# Patient Record
Sex: MALE | Race: BLACK OR AFRICAN AMERICAN | NOT HISPANIC OR LATINO | URBAN - METROPOLITAN AREA
[De-identification: names, ages, dates, MRNs, and addresses within clinical notes are randomized per-mention and may not be internally consistent; named-entity substitution may affect disease eponyms.]

---

## 2018-09-20 ENCOUNTER — INPATIENT (INPATIENT)
Facility: HOSPITAL | Age: 73
LOS: 1 days | Discharge: HOME | End: 2018-09-22
Attending: INTERNAL MEDICINE | Admitting: INTERNAL MEDICINE
Payer: MEDICARE

## 2018-09-20 VITALS
TEMPERATURE: 98 F | RESPIRATION RATE: 17 BRPM | SYSTOLIC BLOOD PRESSURE: 149 MMHG | HEART RATE: 84 BPM | OXYGEN SATURATION: 98 % | DIASTOLIC BLOOD PRESSURE: 106 MMHG | HEIGHT: 70 IN | WEIGHT: 199.96 LBS

## 2018-09-20 DIAGNOSIS — I25.10 ATHEROSCLEROTIC HEART DISEASE OF NATIVE CORONARY ARTERY WITHOUT ANGINA PECTORIS: ICD-10-CM

## 2018-09-20 DIAGNOSIS — I10 ESSENTIAL (PRIMARY) HYPERTENSION: ICD-10-CM

## 2018-09-20 DIAGNOSIS — N40.1 BENIGN PROSTATIC HYPERPLASIA WITH LOWER URINARY TRACT SYMPTOMS: ICD-10-CM

## 2018-09-20 DIAGNOSIS — R53.1 WEAKNESS: ICD-10-CM

## 2018-09-20 DIAGNOSIS — E11.9 TYPE 2 DIABETES MELLITUS WITHOUT COMPLICATIONS: ICD-10-CM

## 2018-09-20 DIAGNOSIS — E16.2 HYPOGLYCEMIA, UNSPECIFIED: ICD-10-CM

## 2018-09-20 PROBLEM — Z00.00 ENCOUNTER FOR PREVENTIVE HEALTH EXAMINATION: Status: ACTIVE | Noted: 2018-09-20

## 2018-09-20 LAB
ALBUMIN SERPL ELPH-MCNC: 3.8 G/DL — SIGNIFICANT CHANGE UP (ref 3.3–5)
ALP SERPL-CCNC: 72 U/L — SIGNIFICANT CHANGE UP (ref 40–120)
ALT FLD-CCNC: 25 U/L — SIGNIFICANT CHANGE UP (ref 12–78)
ANION GAP SERPL CALC-SCNC: 7 MMOL/L — SIGNIFICANT CHANGE UP (ref 5–17)
APPEARANCE UR: CLEAR — SIGNIFICANT CHANGE UP
APTT BLD: 31.4 SEC — SIGNIFICANT CHANGE UP (ref 27.5–37.4)
AST SERPL-CCNC: 30 U/L — SIGNIFICANT CHANGE UP (ref 15–37)
BASOPHILS # BLD AUTO: 0.03 K/UL — SIGNIFICANT CHANGE UP (ref 0–0.2)
BASOPHILS NFR BLD AUTO: 0.6 % — SIGNIFICANT CHANGE UP (ref 0–2)
BILIRUB SERPL-MCNC: 0.3 MG/DL — SIGNIFICANT CHANGE UP (ref 0.2–1.2)
BILIRUB UR-MCNC: NEGATIVE — SIGNIFICANT CHANGE UP
BUN SERPL-MCNC: 13 MG/DL — SIGNIFICANT CHANGE UP (ref 7–23)
CALCIUM SERPL-MCNC: 8.9 MG/DL — SIGNIFICANT CHANGE UP (ref 8.5–10.1)
CHLORIDE SERPL-SCNC: 110 MMOL/L — HIGH (ref 96–108)
CK MB CFR SERPL CALC: 4.1 NG/ML — HIGH (ref 0.5–3.6)
CO2 SERPL-SCNC: 28 MMOL/L — SIGNIFICANT CHANGE UP (ref 22–31)
COLOR SPEC: YELLOW — SIGNIFICANT CHANGE UP
CREAT SERPL-MCNC: 1.07 MG/DL — SIGNIFICANT CHANGE UP (ref 0.5–1.3)
DIFF PNL FLD: NEGATIVE — SIGNIFICANT CHANGE UP
EOSINOPHIL # BLD AUTO: 0 K/UL — SIGNIFICANT CHANGE UP (ref 0–0.5)
EOSINOPHIL NFR BLD AUTO: 0 % — SIGNIFICANT CHANGE UP (ref 0–6)
ESTIMATED AVERAGE GLUCOSE: 169 MG/DL — HIGH (ref 68–114)
GLUCOSE BLDC GLUCOMTR-MCNC: 116 MG/DL — HIGH (ref 70–99)
GLUCOSE BLDC GLUCOMTR-MCNC: 135 MG/DL — HIGH (ref 70–99)
GLUCOSE BLDC GLUCOMTR-MCNC: 51 MG/DL — LOW (ref 70–99)
GLUCOSE BLDC GLUCOMTR-MCNC: 79 MG/DL — SIGNIFICANT CHANGE UP (ref 70–99)
GLUCOSE BLDC GLUCOMTR-MCNC: 97 MG/DL — SIGNIFICANT CHANGE UP (ref 70–99)
GLUCOSE SERPL-MCNC: 50 MG/DL — LOW (ref 70–99)
GLUCOSE UR QL: NEGATIVE MG/DL — SIGNIFICANT CHANGE UP
HBA1C BLD-MCNC: 7.5 % — HIGH (ref 4–5.6)
HBA1C BLD-MCNC: 7.5 % — HIGH (ref 4–5.6)
HCT VFR BLD CALC: 42.5 % — SIGNIFICANT CHANGE UP (ref 39–50)
HGB BLD-MCNC: 13.6 G/DL — SIGNIFICANT CHANGE UP (ref 13–17)
IMM GRANULOCYTES NFR BLD AUTO: 0.2 % — SIGNIFICANT CHANGE UP (ref 0–1.5)
INR BLD: 1.02 RATIO — SIGNIFICANT CHANGE UP (ref 0.88–1.16)
KETONES UR-MCNC: NEGATIVE — SIGNIFICANT CHANGE UP
LEUKOCYTE ESTERASE UR-ACNC: ABNORMAL
LYMPHOCYTES # BLD AUTO: 1.28 K/UL — SIGNIFICANT CHANGE UP (ref 1–3.3)
LYMPHOCYTES # BLD AUTO: 26.1 % — SIGNIFICANT CHANGE UP (ref 13–44)
MCHC RBC-ENTMCNC: 29 PG — SIGNIFICANT CHANGE UP (ref 27–34)
MCHC RBC-ENTMCNC: 32 GM/DL — SIGNIFICANT CHANGE UP (ref 32–36)
MCV RBC AUTO: 90.6 FL — SIGNIFICANT CHANGE UP (ref 80–100)
MONOCYTES # BLD AUTO: 0.34 K/UL — SIGNIFICANT CHANGE UP (ref 0–0.9)
MONOCYTES NFR BLD AUTO: 6.9 % — SIGNIFICANT CHANGE UP (ref 2–14)
NEUTROPHILS # BLD AUTO: 3.25 K/UL — SIGNIFICANT CHANGE UP (ref 1.8–7.4)
NEUTROPHILS NFR BLD AUTO: 66.2 % — SIGNIFICANT CHANGE UP (ref 43–77)
NITRITE UR-MCNC: NEGATIVE — SIGNIFICANT CHANGE UP
PH UR: 7 — SIGNIFICANT CHANGE UP (ref 5–8)
PLATELET # BLD AUTO: 161 K/UL — SIGNIFICANT CHANGE UP (ref 150–400)
POTASSIUM SERPL-MCNC: 4.6 MMOL/L — SIGNIFICANT CHANGE UP (ref 3.5–5.3)
POTASSIUM SERPL-SCNC: 4.6 MMOL/L — SIGNIFICANT CHANGE UP (ref 3.5–5.3)
PROT SERPL-MCNC: 7.9 GM/DL — SIGNIFICANT CHANGE UP (ref 6–8.3)
PROT UR-MCNC: 15 MG/DL
PROTHROM AB SERPL-ACNC: 11.1 SEC — SIGNIFICANT CHANGE UP (ref 9.8–12.7)
RBC # BLD: 4.69 M/UL — SIGNIFICANT CHANGE UP (ref 4.2–5.8)
RBC # FLD: 12.6 % — SIGNIFICANT CHANGE UP (ref 10.3–14.5)
SODIUM SERPL-SCNC: 145 MMOL/L — SIGNIFICANT CHANGE UP (ref 135–145)
SP GR SPEC: 1 — LOW (ref 1.01–1.02)
TROPONIN I SERPL-MCNC: 0.07 NG/ML — HIGH (ref 0.01–0.04)
TROPONIN I SERPL-MCNC: 0.07 NG/ML — HIGH (ref 0.01–0.04)
UROBILINOGEN FLD QL: NEGATIVE MG/DL — SIGNIFICANT CHANGE UP
WBC # BLD: 4.91 K/UL — SIGNIFICANT CHANGE UP (ref 3.8–10.5)
WBC # FLD AUTO: 4.91 K/UL — SIGNIFICANT CHANGE UP (ref 3.8–10.5)

## 2018-09-20 PROCEDURE — 70450 CT HEAD/BRAIN W/O DYE: CPT | Mod: 26

## 2018-09-20 PROCEDURE — 99285 EMERGENCY DEPT VISIT HI MDM: CPT

## 2018-09-20 PROCEDURE — 71045 X-RAY EXAM CHEST 1 VIEW: CPT | Mod: 26

## 2018-09-20 PROCEDURE — 93010 ELECTROCARDIOGRAM REPORT: CPT

## 2018-09-20 PROCEDURE — 70551 MRI BRAIN STEM W/O DYE: CPT | Mod: 26

## 2018-09-20 PROCEDURE — 99223 1ST HOSP IP/OBS HIGH 75: CPT

## 2018-09-20 RX ORDER — METOPROLOL TARTRATE 50 MG
1 TABLET ORAL
Qty: 0 | Refills: 0 | COMMUNITY

## 2018-09-20 RX ORDER — DEXTROSE 50 % IN WATER 50 %
50 SYRINGE (ML) INTRAVENOUS ONCE
Qty: 0 | Refills: 0 | Status: COMPLETED | OUTPATIENT
Start: 2018-09-20 | End: 2018-09-20

## 2018-09-20 RX ORDER — INSULIN LISPRO 100/ML
VIAL (ML) SUBCUTANEOUS AT BEDTIME
Qty: 0 | Refills: 0 | Status: DISCONTINUED | OUTPATIENT
Start: 2018-09-20 | End: 2018-09-22

## 2018-09-20 RX ORDER — ATORVASTATIN CALCIUM 80 MG/1
1 TABLET, FILM COATED ORAL
Qty: 0 | Refills: 0 | COMMUNITY

## 2018-09-20 RX ORDER — ENOXAPARIN SODIUM 100 MG/ML
40 INJECTION SUBCUTANEOUS DAILY
Qty: 0 | Refills: 0 | Status: DISCONTINUED | OUTPATIENT
Start: 2018-09-20 | End: 2018-09-22

## 2018-09-20 RX ORDER — TAMSULOSIN HYDROCHLORIDE 0.4 MG/1
1 CAPSULE ORAL
Qty: 0 | Refills: 0 | COMMUNITY

## 2018-09-20 RX ORDER — PANTOPRAZOLE SODIUM 20 MG/1
40 TABLET, DELAYED RELEASE ORAL
Qty: 0 | Refills: 0 | Status: DISCONTINUED | OUTPATIENT
Start: 2018-09-20 | End: 2018-09-22

## 2018-09-20 RX ORDER — INSULIN GLARGINE 100 [IU]/ML
14 INJECTION, SOLUTION SUBCUTANEOUS AT BEDTIME
Qty: 0 | Refills: 0 | Status: DISCONTINUED | OUTPATIENT
Start: 2018-09-20 | End: 2018-09-22

## 2018-09-20 RX ORDER — DEXTROSE 50 % IN WATER 50 %
25 SYRINGE (ML) INTRAVENOUS ONCE
Qty: 0 | Refills: 0 | Status: DISCONTINUED | OUTPATIENT
Start: 2018-09-20 | End: 2018-09-22

## 2018-09-20 RX ORDER — METFORMIN HYDROCHLORIDE 850 MG/1
1 TABLET ORAL
Qty: 0 | Refills: 0 | COMMUNITY

## 2018-09-20 RX ORDER — ISOSORBIDE MONONITRATE 60 MG/1
1 TABLET, EXTENDED RELEASE ORAL
Qty: 0 | Refills: 0 | COMMUNITY

## 2018-09-20 RX ORDER — FINASTERIDE 5 MG/1
1 TABLET, FILM COATED ORAL
Qty: 0 | Refills: 0 | COMMUNITY

## 2018-09-20 RX ORDER — FINASTERIDE 5 MG/1
5 TABLET, FILM COATED ORAL DAILY
Qty: 0 | Refills: 0 | Status: DISCONTINUED | OUTPATIENT
Start: 2018-09-20 | End: 2018-09-22

## 2018-09-20 RX ORDER — LIRAGLUTIDE 6 MG/ML
1.2 INJECTION SUBCUTANEOUS
Qty: 0 | Refills: 0 | COMMUNITY

## 2018-09-20 RX ORDER — TAMSULOSIN HYDROCHLORIDE 0.4 MG/1
0.4 CAPSULE ORAL AT BEDTIME
Qty: 0 | Refills: 0 | Status: DISCONTINUED | OUTPATIENT
Start: 2018-09-20 | End: 2018-09-22

## 2018-09-20 RX ORDER — CLOPIDOGREL BISULFATE 75 MG/1
75 TABLET, FILM COATED ORAL DAILY
Qty: 0 | Refills: 0 | Status: DISCONTINUED | OUTPATIENT
Start: 2018-09-20 | End: 2018-09-22

## 2018-09-20 RX ORDER — ISOSORBIDE MONONITRATE 60 MG/1
20 TABLET, EXTENDED RELEASE ORAL
Qty: 0 | Refills: 0 | Status: DISCONTINUED | OUTPATIENT
Start: 2018-09-20 | End: 2018-09-20

## 2018-09-20 RX ORDER — DEXTROSE 50 % IN WATER 50 %
12.5 SYRINGE (ML) INTRAVENOUS ONCE
Qty: 0 | Refills: 0 | Status: DISCONTINUED | OUTPATIENT
Start: 2018-09-20 | End: 2018-09-22

## 2018-09-20 RX ORDER — ATORVASTATIN CALCIUM 80 MG/1
10 TABLET, FILM COATED ORAL AT BEDTIME
Qty: 0 | Refills: 0 | Status: DISCONTINUED | OUTPATIENT
Start: 2018-09-20 | End: 2018-09-22

## 2018-09-20 RX ORDER — SODIUM CHLORIDE 9 MG/ML
1000 INJECTION, SOLUTION INTRAVENOUS
Qty: 0 | Refills: 0 | Status: DISCONTINUED | OUTPATIENT
Start: 2018-09-20 | End: 2018-09-22

## 2018-09-20 RX ORDER — OMEPRAZOLE 10 MG/1
1 CAPSULE, DELAYED RELEASE ORAL
Qty: 0 | Refills: 0 | COMMUNITY

## 2018-09-20 RX ORDER — CLOPIDOGREL BISULFATE 75 MG/1
1 TABLET, FILM COATED ORAL
Qty: 0 | Refills: 0 | COMMUNITY

## 2018-09-20 RX ORDER — METOPROLOL TARTRATE 50 MG
25 TABLET ORAL
Qty: 0 | Refills: 0 | Status: DISCONTINUED | OUTPATIENT
Start: 2018-09-20 | End: 2018-09-22

## 2018-09-20 RX ORDER — DEXTROSE 50 % IN WATER 50 %
15 SYRINGE (ML) INTRAVENOUS ONCE
Qty: 0 | Refills: 0 | Status: DISCONTINUED | OUTPATIENT
Start: 2018-09-20 | End: 2018-09-22

## 2018-09-20 RX ORDER — GLUCAGON INJECTION, SOLUTION 0.5 MG/.1ML
1 INJECTION, SOLUTION SUBCUTANEOUS ONCE
Qty: 0 | Refills: 0 | Status: DISCONTINUED | OUTPATIENT
Start: 2018-09-20 | End: 2018-09-22

## 2018-09-20 RX ORDER — ISOSORBIDE MONONITRATE 60 MG/1
30 TABLET, EXTENDED RELEASE ORAL DAILY
Qty: 0 | Refills: 0 | Status: DISCONTINUED | OUTPATIENT
Start: 2018-09-20 | End: 2018-09-22

## 2018-09-20 RX ORDER — INSULIN LISPRO 100/ML
VIAL (ML) SUBCUTANEOUS
Qty: 0 | Refills: 0 | Status: DISCONTINUED | OUTPATIENT
Start: 2018-09-20 | End: 2018-09-22

## 2018-09-20 RX ADMIN — ATORVASTATIN CALCIUM 10 MILLIGRAM(S): 80 TABLET, FILM COATED ORAL at 21:20

## 2018-09-20 RX ADMIN — Medication 25 MILLIGRAM(S): at 19:47

## 2018-09-20 RX ADMIN — Medication 50 MILLILITER(S): at 09:33

## 2018-09-20 RX ADMIN — TAMSULOSIN HYDROCHLORIDE 0.4 MILLIGRAM(S): 0.4 CAPSULE ORAL at 21:20

## 2018-09-20 NOTE — PHYSICAL THERAPY INITIAL EVALUATION ADULT - SOCIAL CONCERNS
Complex psychosocial needs/coping issues/Patient reports he is at home a lot by himself, with concern about falling and not having someone to assist. Patient educated on life alert/similar system or having cell phone at all times in order to be able to get assistance in case of a medical emergency.

## 2018-09-20 NOTE — ED PROVIDER NOTE - PMH
Acute Renal Failure    BPH (Benign Prostatic Hyperplasia)    CAD (Coronary Artery Disease)    DM (Diabetes Mellitus)    H/O: Stroke    HTN (Hypertension)

## 2018-09-20 NOTE — ED PROVIDER NOTE - MEDICAL DECISION MAKING DETAILS
resolved right weakness/slurred speech with unknown onset of symptoms (nl last night, happpened sometime overnight with >12 hours ago, but hypoglycemic as well, and symptoms resolved. no indication for tpa given those facts. likely hypoglycemia though pt with vascular hx. cth, labs, d50 and feed, likely admission for glucose monitoring given sufonylurea, as well as neuro consult/mri.

## 2018-09-20 NOTE — ED PROVIDER NOTE - OBJECTIVE STATEMENT
74 y/o male hx DM (lantus and glimiperide), htn, stroke 10 years ago without residual deficits, CAD (2 stents) present s/p resolve right UE/LE weakness with slurred speech from overnight. Per EMS/son/pt, pt usual state of health last night going to bed, got up and was weak/slower lowered self to ground from bed at 3am, son found pt at 5am who had right sided weakness, put back to bed, pt still with slurred speech and right weakness around 8 so son called ambulance. EMS arrived pt fs was ~35, given oral juice. On arrival to ED, fs 51, pt without any weakness, or slurred speech. Denies any pain, fever/chills/n/v or other symptoms.     ROS: No fever/chills. No eye pain/changes in vision, No ear pain/sore throat/dysphagia, No chest pain/palpitations. No SOB/cough/. No abdominal pain, N/V/D, no black/bloody bm. No dysuria/frequency/discharge, No headache. No Dizziness.    No rashes or breaks in skin.

## 2018-09-20 NOTE — H&P ADULT - FAMILY HISTORY
Father  Still living? Unknown  Family history of diabetes mellitus in father, Age at diagnosis: Age Unknown     Mother  Still living? Unknown  Family history of diabetes mellitus in father, Age at diagnosis: Age Unknown

## 2018-09-20 NOTE — ED ADULT NURSE NOTE - NSIMPLEMENTINTERV_GEN_ALL_ED
Implemented All Fall Risk Interventions:  [place] to call system. Call bell, personal items and telephone within reach. Instruct patient to call for assistance. Room bathroom lighting operational. Non-slip footwear when patient is off stretcher. Physically safe environment: no spills, clutter or unnecessary equipment. Stretcher in lowest position, wheels locked, appropriate side rails in place. Provide visual cue, wrist band, yellow gown, etc. Monitor gait and stability. Monitor for mental status changes and reorient to person, place, and time. Review medications for side effects contributing to fall risk. Reinforce activity limits and safety measures with patient and family.

## 2018-09-20 NOTE — ED ADULT TRIAGE NOTE - CHIEF COMPLAINT QUOTE
low blood sugar of 35  glucagon  and juice given low blood sugar of 35  glucagon  and juice given  patient fell out of bed at 3 am  today was unable to get up reached son at 5:30 this morning and son placed back in bed at 730 son noticed patient speech slurred when ems arrived fs 35 and right side paralysis which resolved after having glucagon and juice

## 2018-09-20 NOTE — PHYSICAL THERAPY INITIAL EVALUATION ADULT - ADDITIONAL COMMENTS
Patient reports he lives a private house with 3 steps to enter, no rail and 1 flight once inside, +rail.

## 2018-09-20 NOTE — ED ADULT NURSE NOTE - OBJECTIVE STATEMENT
To the ed via EMS son called pt with numbness right arm notice 5am and then facial numbness at 7am. Pt fell last night going to the bathroom. He had a fingerstick with EMS of 35mg/dl was given juice. Pt is a DM and prostate problem. States he feel better

## 2018-09-20 NOTE — H&P ADULT - NSHPLABSRESULTS_GEN_ALL_CORE
13.6   4.91  )-----------( 161      ( 20 Sep 2018 09:37 )             42.5   09-20    145  |  110<H>  |  13  ----------------------------<  50<L>  4.6   |  28  |  1.07    Ca    8.9      20 Sep 2018 09:37    TPro  7.9  /  Alb  3.8  /  TBili  0.3  /  DBili  x   /  AST  30  /  ALT  25  /  AlkPhos  72  09-20  < from: CT Brain Stroke Protocol (09.20.18 @ 10:35) >      Involutional and ischemic gliotic changes.  No acute intracranial hemorrhage. 13.6   4.91  )-----------( 161      ( 20 Sep 2018 09:37 )             42.5   09-20    145  |  110<H>  |  13  ----------------------------<  50<L>  4.6   |  28  |  1.07    Ca    8.9      20 Sep 2018 09:37    TPro  7.9  /  Alb  3.8  /  TBili  0.3  /  DBili  x   /  AST  30  /  ALT  25  /  AlkPhos  72  09-20  < from: CT Brain Stroke Protocol (09.20.18 @ 10:35) >      Involutional and ischemic gliotic changes.  No acute intracranial hemorrhage.      < from: Xray Chest 1 View- PORTABLE-Urgent (09.20.18 @ 09:43) >    MPRESSION: Clear lungs.

## 2018-09-20 NOTE — H&P ADULT - HISTORY OF PRESENT ILLNESS
72 y/o male hx DM (lantus and glimiperide), htn, stroke 10 years ago without residual deficits, CAD (2 stents) present s/p resolve right UE/LE weakness with slurred speech from overnight. Per EMS/son/pt, pt usual state of health last night going to bed, got up and was weak/slower lowered self to ground from bed at 3am, son found pt at 5am who had right sided weakness, put back to bed, pt still with slurred speech and right weakness around 8 so son called ambulance. EMS arrived pt fs was ~35, given oral juice. On arrival to ED, fs 51, pt without any weakness, or slurred speech. Denies any pain, fever/chills/n/v or other symptoms. 72 y/o male hx DM (lantus and glimiperide), htn, stroke 10 years ago without residual deficits, CAD (2 stents) present s/p resolve right UE/LE weakness with slurred speech from overnight. Per EMS/son/pt, pt usual state of health last night going to bed, got up and was weak/slower lowered self to ground from bed at 3am, son found pt at 5am who had right sided weakness, put back to bed, pt still with slurred speech and right weakness around 8 so son called ambulance. EMS arrived pt fs was ~35, given oral juice. On arrival to ED, fs 51, pt without any weakness, or slurred speech. Denies any pain, fever/chills/n/v or other symptoms.  all neurological deficits resolved with normalization of his glucose level

## 2018-09-20 NOTE — ED PROVIDER NOTE - PHYSICAL EXAMINATION
NIHSS: 0  Gen: No acute distress, non toxic. well appearing  HEENT: Mucous membranes moist, pink conjunctivae, EOMI  CV: RRR, nl s1/s2.  Resp: CTAB, normal rate and effort  GI: Abdomen soft, NT, ND. No rebound, no guarding  : No CVAT  Neuro: A&O x 3, moving all 4 extremities. 5/5 strength b/l UE and LE. cn 2-12 wnl. no facial droop. no slurred speech. follows commands. nl finger to nose.   MSK: No spine or joint tenderness to palpation. full rom of all extremities without pain/deformity. no shortening/rotation LE  Skin: No rashes. intact and perfused.

## 2018-09-20 NOTE — PHYSICAL THERAPY INITIAL EVALUATION ADULT - PERTINENT HX OF CURRENT PROBLEM, REHAB EVAL
Patient admitted with R UE/LE weakness and slurred speech, found to have finger stick of 35, given juice and increased to 51. Patient with normalized glucose levels in ED, with symptoms resolving. CT brain shows no acute process and chest x-ray negative. Patient noted to have elevated troponin of 0.67.

## 2018-09-20 NOTE — PATIENT PROFILE ADULT. - NS TRANSFER PATIENT BELONGINGS
Money/Jewelry/Money (specify)/Cell Phone/PDA (specify) Jewelry/Money (specify)/Money/ 1 yellow metal necklace/ 1 yellow ring/Wrist Watch/Cell Phone/PDA (specify)

## 2018-09-20 NOTE — ED PROVIDER NOTE - PROGRESS NOTE DETAILS
Matteo: pt mentating well, no neuro deficits, small trop, possible related to tia/resolved stroke type event vs stress from hypoglycemia. fed, and received amp, will repeat fs. ct neg. admitted to Dr. Salcido for hypoglycemia on sufonylurea and possible tia. Dr. Wagner sierra

## 2018-09-20 NOTE — H&P ADULT - ASSESSMENT
73m with diabetes with neurologivcal deficits  from hypoglycemia- all have resolved       IMPROVE VTE Individual Risk Assessment          RISK                                                          Points    [  ] Previous VTE                                                3    [  ] Thrombophilia                                             2    [  ] Lower limb paralysis                                    2        (unable to hold up >15 seconds)      [  ] Current Cancer                                             2         (within 6 months)    [  x] Immobilization > 24 hrs                              1    [  ] ICU/CCU stay > 24 hours                            1    [  x] Age > 60                                                    1    IMPROVE VTE Score ______2___

## 2018-09-20 NOTE — CONSULT NOTE ADULT - ASSESSMENT
Subjective Complaints:  Historian:       Consult requested by ER doctor:                  Attending:     HPI:    TOPHER WILLIS.  72 y/o male hx DM (lantus and glimiperide), htn, stroke 10 years ago without residual deficits, CAD (2 stents) present s/p resolve right UE/LE weakness with slurred speech from overnight. Per EMS/son/pt, pt usual state of health last night going to bed, got up and was weak/slower lowered self to ground from bed at 3am, son found pt at 5am who had right sided weakness, put back to bed, pt still with slurred speech and right weakness around 8 so son called ambulance. EMS arrived pt fs was ~35, given oral juice. On arrival to ED, fs 51, pt without any weakness, or slurred speech. Denies any pain, fever/chills/n/v or other symptoms.  all neurological deficits resolved with normalization of his glucose level (20 Sep 2018 11:03)    PAST MEDICAL & SURGICAL HISTORY:  Acute Renal Failure; BPH; H/O: Stroke; CAD; DM; HTN; Cholelithiasis    MEDICATIONS  (STANDING):  atorvastatin 10 milliGRAM(s) Oral at bedtime  clopidogrel Tablet 75 milliGRAM(s) Oral daily  dextrose 5%. 1000 milliLiter(s) (50 mL/Hr) IV Continuous <Continuous>  dextrose 50% Injectable 12.5 Gram(s) IV Push once  dextrose 50% Injectable 25 Gram(s) IV Push once  dextrose 50% Injectable 25 Gram(s) IV Push once  enalapril 5 milliGRAM(s) Oral daily  enoxaparin Injectable 40 milliGRAM(s) SubCutaneous daily  finasteride 5 milliGRAM(s) Oral daily  insulin glargine Injectable (LANTUS) 14 Unit(s) SubCutaneous at bedtime  insulin lispro (HumaLOG) corrective regimen sliding scale   SubCutaneous three times a day before meals  insulin lispro (HumaLOG) corrective regimen sliding scale   SubCutaneous at bedtime  isosorbide   mononitrate ER Tablet (IMDUR) 30 milliGRAM(s) Oral daily  metoprolol tartrate 25 milliGRAM(s) Oral two times a day  pantoprazole    Tablet 40 milliGRAM(s) Oral before breakfast  tamsulosin 0.4 milliGRAM(s) Oral at bedtime    MEDICATIONS  (PRN):  dextrose 40% Gel 15 Gram(s) Oral once PRN Blood Glucose LESS THAN 70 milliGRAM(s)/deciliter  glucagon  Injectable 1 milliGRAM(s) IntraMuscular once PRN Glucose LESS THAN 70 milligrams/deciliter    Allergies: No Known Allergies  Intolerances  FAMILY HISTORY:  Family history of diabetes mellitus in father (Father, Mother)    Vital Signs Last 24 Hrs  T(C): 36.6 (20 Sep 2018 11:45), Max: 36.6 (20 Sep 2018 08:55)  T(F): 97.8 (20 Sep 2018 11:45), Max: 97.8 (20 Sep 2018 08:55)  HR: 81 (20 Sep 2018 11:45) (81 - 84)  BP: 147/70 (20 Sep 2018 11:45) (147/70 - 149/106)  BP(mean): --  RR: 22 (20 Sep 2018 11:45) (17 - 22)  SpO2: 97% (20 Sep 2018 11:45) (97% - 98%)    NEUROLOGICAL EXAM:  HENT:  Normocephalic head; atraumatic head.  Neck supple.  ENT: normal looking.  Mental State:    Alert.  Fully oriented to person, place and date.  Coherent.  Speech clear and intact.  Cooperative.  Responds appropriately.    Cranial Nerves:  II-XII:   Pupils round and reactive to light and accommodation.  Extraocular movements full.  Visual fields full (no homonymous hemianopsia).  Visual acuity wnl.  Facial symmetry intact.  Tongue midline.  Motor Functions:  Moves all extremities.  No pronator drift of UE.  Claps hand well.  Hand  intact bilaterally.  Ambulatory.    Sensory Functions:   Intact to touch and pinprick to face and extremities.    Reflexes:  Deep tendon reflexes normoactive to biceps, knees and ankles.  Babinski absent (present).  Cerebellar Testing:    Finger to nose intact.  Nystagmus absent.  Neurovascular: Carotid auscultation full without bruits.      LABS:                        13.6   4.91  )-----------( 161      ( 20 Sep 2018 09:37 )             42.5     09-20    145  |  110<H>  |  13  ----------------------------<  50<L>  4.6   |  28  |  1.07    Ca    8.9      20 Sep 2018 09:37    TPro  7.9  /  Alb  3.8  /  TBili  0.3  /  DBili  x   /  AST  30  /  ALT  25  /  AlkPhos  72  09-20    PT/INR - ( 20 Sep 2018 09:37 )   PT: 11.1 sec;   INR: 1.02 ratio         PTT - ( 20 Sep 2018 09:37 )  PTT:31.4 sec    Urinalysis Basic - ( 20 Sep 2018 09:25 )    Color: Yellow / Appearance: Clear / S.005 / pH: x  Gluc: x / Ketone: Negative  / Bili: Negative / Urobili: Negative mg/dL   Blood: x / Protein: 15 mg/dL / Nitrite: Negative   Leuk Esterase: Small / RBC: 0-2 /HPF / WBC 3-5   Sq Epi: x / Non Sq Epi: Occasional / Bacteria: Occasional      Assessment & Opinion:    Recommendations:  Brain MRI.  Carotid doppler.  Echocardiogram.  EEG.   DVT prophylaxis as ordered.  Medications: Historian:   Patient.  ER note reviewed.  Brain CT:  Involutional and ischemic gliotic changes.  No acute intracranial hemorrhage.  HPI:    TOPHER WILLIS.  74 y/o male hx DM (lantus and glimiperide), htn, stroke 10 years ago without residual deficits, CAD (2 stents) present s/p resolve right UE/LE weakness with slurred speech from overnight. Per EMS/son/pt, pt usual state of health last night going to bed, got up and was weak/slower lowered self to ground from bed at 3am, son found pt at 5am who had right sided weakness, put back to bed, pt still with slurred speech and right weakness around 8 so son called ambulance. EMS arrived pt fs was ~35, given oral juice. On arrival to ED, fs 51, pt without any weakness, or slurred speech. Denies any pain, fever/chills/n/v or other symptoms.  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 1000 milliLiter(s) (50 mL/Hr) IV Continuous <Continuous>  dextrose 50% Injectable 12.5 Gram(s) IV Push once  dextrose 50% Injectable 25 Gram(s) IV Push once  dextrose 50% Injectable 25 Gram(s) IV Push once  enalapril 5 milliGRAM(s) Oral daily  enoxaparin Injectable 40 milliGRAM(s) SubCutaneous daily  finasteride 5 milliGRAM(s) Oral daily  insulin glargine Injectable (LANTUS) 14 Unit(s) SubCutaneous at bedtime  insulin lispro (HumaLOG) corrective regimen sliding scale   SubCutaneous three times a day before meals  insulin lispro (HumaLOG) corrective regimen sliding scale   SubCutaneous at bedtime  isosorbide   mononitrate ER Tablet (IMDUR) 30 milliGRAM(s) Oral daily  metoprolol tartrate 25 milliGRAM(s) Oral two times a day  pantoprazole    Tablet 40 milliGRAM(s) Oral before breakfast  tamsulosin 0.4 milliGRAM(s) Oral at bedtime    MEDICATIONS  (PRN):  dextrose 40% Gel 15 Gram(s) Oral once PRN Blood Glucose LESS THAN 70 milliGRAM(s)/deciliter  glucagon  Injectable 1 milliGRAM(s) IntraMuscular once PRN Glucose LESS THAN 70 milligrams/deciliter    Allergies: No Known Allergies  Intolerances  FAMILY HISTORY:  Family history of diabetes mellitus in father (Father, Mother)    Vital Signs Last 24 Hrs  T(C): 36.6 (20 Sep 2018 11:45), Max: 36.6 (20 Sep 2018 08:55)  T(F): 97.8 (20 Sep 2018 11:45), Max: 97.8 (20 Sep 2018 08:55)  HR: 81 (20 Sep 2018 11:45) (81 - 84)  BP: 147/70 (20 Sep 2018 11:45) (147/70 - 149/106)  BP(mean): --  RR: 22 (20 Sep 2018 11:45) (17 - 22)  SpO2: 97% (20 Sep 2018 11:45) (97% - 98%)    NEUROLOGICAL EXAM:  HENT:  Normocephalic head; atraumatic head.  Neck supple.  ENT: normal looking.  Mental State:    Alert.  Fully oriented to person, place and date.  Coherent.  Speech clear and intact.  Cooperative.  Responds appropriately.    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  Extraocular movements full.  Facial symmetry intact.  Tongue midline.  Motor Functions:  Moves all extremities.  No pronator drift of UE.  Claps hands well.  Hand  intact bilaterally.      Sensory Functions:   Intact to touch and pinprick to face and extremities.    Reflexes:  Deep tendon reflexes normoactive to knees and ankles.    Cerebellar Testing:    Finger to nose intact.  Nystagmus absent.  Neurovascular: Carotid auscultation full without bruits.      LABS:             13.6   4.91  )-----------( 161      ( 20 Sep 2018 09:37 )             42.5     09-20    145  |  110<H>  |  13  ----------------------------<  50<L>  4.6   |  28  |  1.07  Ca    8.9      20 Sep 2018 09:37  TPro  7.9  /  Alb  3.8  /  TBili  0.3  /  DBili  x   /  AST  30  /  ALT  25  /  AlkPhos  72  09-20  PT/INR - ( 20 Sep 2018 09:37 )   PT: 11.1 sec;   INR: 1.02 ratio    PTT - ( 20 Sep 2018 09:37 )  PTT:31.4 sec    Assessment & Opinion:  Transient Ischemic Attack, fully resolved.  Recommendations:  Brain MRI.  Carotid doppler.  Echocardiogram.  EEG.   DVT prophylaxis as ordered.  PT/OT eval  Medications:  Continue current medications.  Will follow

## 2018-09-21 LAB
ANION GAP SERPL CALC-SCNC: 9 MMOL/L — SIGNIFICANT CHANGE UP (ref 5–17)
BUN SERPL-MCNC: 14 MG/DL — SIGNIFICANT CHANGE UP (ref 7–23)
CALCIUM SERPL-MCNC: 9.1 MG/DL — SIGNIFICANT CHANGE UP (ref 8.5–10.1)
CHLORIDE SERPL-SCNC: 109 MMOL/L — HIGH (ref 96–108)
CHOLEST SERPL-MCNC: 91 MG/DL — SIGNIFICANT CHANGE UP (ref 10–199)
CO2 SERPL-SCNC: 26 MMOL/L — SIGNIFICANT CHANGE UP (ref 22–31)
CREAT SERPL-MCNC: 1.11 MG/DL — SIGNIFICANT CHANGE UP (ref 0.5–1.3)
CULTURE RESULTS: SIGNIFICANT CHANGE UP
GLUCOSE BLDC GLUCOMTR-MCNC: 68 MG/DL — LOW (ref 70–99)
GLUCOSE SERPL-MCNC: 88 MG/DL — SIGNIFICANT CHANGE UP (ref 70–99)
HBA1C BLD-MCNC: 7.6 % — HIGH (ref 4–5.6)
HCT VFR BLD CALC: 40.5 % — SIGNIFICANT CHANGE UP (ref 39–50)
HDLC SERPL-MCNC: 48 MG/DL — SIGNIFICANT CHANGE UP
HGB BLD-MCNC: 12.9 G/DL — LOW (ref 13–17)
LIPID PNL WITH DIRECT LDL SERPL: 35 MG/DL — SIGNIFICANT CHANGE UP
MCHC RBC-ENTMCNC: 28.9 PG — SIGNIFICANT CHANGE UP (ref 27–34)
MCHC RBC-ENTMCNC: 31.9 GM/DL — LOW (ref 32–36)
MCV RBC AUTO: 90.6 FL — SIGNIFICANT CHANGE UP (ref 80–100)
NRBC # BLD: 0 /100 WBCS — SIGNIFICANT CHANGE UP (ref 0–0)
PLATELET # BLD AUTO: 158 K/UL — SIGNIFICANT CHANGE UP (ref 150–400)
POTASSIUM SERPL-MCNC: 4.1 MMOL/L — SIGNIFICANT CHANGE UP (ref 3.5–5.3)
POTASSIUM SERPL-SCNC: 4.1 MMOL/L — SIGNIFICANT CHANGE UP (ref 3.5–5.3)
RBC # BLD: 4.47 M/UL — SIGNIFICANT CHANGE UP (ref 4.2–5.8)
RBC # FLD: 12.5 % — SIGNIFICANT CHANGE UP (ref 10.3–14.5)
SODIUM SERPL-SCNC: 144 MMOL/L — SIGNIFICANT CHANGE UP (ref 135–145)
SPECIMEN SOURCE: SIGNIFICANT CHANGE UP
TOTAL CHOLESTEROL/HDL RATIO MEASUREMENT: 1.9 RATIO — LOW (ref 3.4–9.6)
TRIGL SERPL-MCNC: 41 MG/DL — SIGNIFICANT CHANGE UP (ref 10–149)
TROPONIN I SERPL-MCNC: 0.06 NG/ML — HIGH (ref 0.01–0.04)
TROPONIN I SERPL-MCNC: 0.07 NG/ML — HIGH (ref 0.01–0.04)
WBC # BLD: 5.43 K/UL — SIGNIFICANT CHANGE UP (ref 3.8–10.5)
WBC # FLD AUTO: 5.43 K/UL — SIGNIFICANT CHANGE UP (ref 3.8–10.5)

## 2018-09-21 PROCEDURE — 99233 SBSQ HOSP IP/OBS HIGH 50: CPT

## 2018-09-21 PROCEDURE — 93880 EXTRACRANIAL BILAT STUDY: CPT | Mod: 26

## 2018-09-21 RX ADMIN — Medication 3: at 16:43

## 2018-09-21 RX ADMIN — Medication 1: at 12:07

## 2018-09-21 RX ADMIN — FINASTERIDE 5 MILLIGRAM(S): 5 TABLET, FILM COATED ORAL at 14:11

## 2018-09-21 RX ADMIN — TAMSULOSIN HYDROCHLORIDE 0.4 MILLIGRAM(S): 0.4 CAPSULE ORAL at 22:21

## 2018-09-21 RX ADMIN — ENOXAPARIN SODIUM 40 MILLIGRAM(S): 100 INJECTION SUBCUTANEOUS at 14:10

## 2018-09-21 RX ADMIN — PANTOPRAZOLE SODIUM 40 MILLIGRAM(S): 20 TABLET, DELAYED RELEASE ORAL at 06:58

## 2018-09-21 RX ADMIN — Medication 5 MILLIGRAM(S): at 06:58

## 2018-09-21 RX ADMIN — ISOSORBIDE MONONITRATE 30 MILLIGRAM(S): 60 TABLET, EXTENDED RELEASE ORAL at 14:11

## 2018-09-21 RX ADMIN — Medication 25 MILLIGRAM(S): at 06:58

## 2018-09-21 RX ADMIN — CLOPIDOGREL BISULFATE 75 MILLIGRAM(S): 75 TABLET, FILM COATED ORAL at 12:08

## 2018-09-21 RX ADMIN — ATORVASTATIN CALCIUM 10 MILLIGRAM(S): 80 TABLET, FILM COATED ORAL at 22:21

## 2018-09-21 RX ADMIN — INSULIN GLARGINE 14 UNIT(S): 100 INJECTION, SOLUTION SUBCUTANEOUS at 22:24

## 2018-09-21 NOTE — PROGRESS NOTE ADULT - ASSESSMENT
74 y/o male hx DM (lantus and glimiperide), HTN, stroke 10 years ago without residual deficits, CAD (2 stents) presented with right UE/LE weakness with slurred speech.    TIA:  - MRI head negative  - Echo and carotid doppler negative.  - Seen and evaluated by neuro  - Continue plavix. statin    Hypoglycemia:  - Resolved  - In the setting of Insulin, glimiperide    DM:  - Hb A1c 7.6  - Continue current insulin regimen  - Hold PO med    CAD:  - Continue plavix, statin  - Asymptomatic now    HTN:  - BP acceptable  - Continue current meds.    Urinary retention  - Will dc sarkar cath, TOV  - Continue Flomax Proscar.     DVT ppx:  - On lovenox

## 2018-09-21 NOTE — PROGRESS NOTE ADULT - SUBJECTIVE AND OBJECTIVE BOX
Patient is a 73y old  Male who presents with a chief complaint of weakness (20 Sep 2018 13:40)      INTERVAL HPI/OVERNIGHT EVENTS:  Pt was seen and examined, no acute events.    MEDICATIONS  (STANDING):  atorvastatin 10 milliGRAM(s) Oral at bedtime  clopidogrel Tablet 75 milliGRAM(s) Oral daily  dextrose 5%. 1000 milliLiter(s) (50 mL/Hr) IV Continuous <Continuous>  dextrose 50% Injectable 12.5 Gram(s) IV Push once  dextrose 50% Injectable 25 Gram(s) IV Push once  dextrose 50% Injectable 25 Gram(s) IV Push once  enalapril 5 milliGRAM(s) Oral daily  enoxaparin Injectable 40 milliGRAM(s) SubCutaneous daily  finasteride 5 milliGRAM(s) Oral daily  insulin glargine Injectable (LANTUS) 14 Unit(s) SubCutaneous at bedtime  insulin lispro (HumaLOG) corrective regimen sliding scale   SubCutaneous three times a day before meals  insulin lispro (HumaLOG) corrective regimen sliding scale   SubCutaneous at bedtime  isosorbide   mononitrate ER Tablet (IMDUR) 30 milliGRAM(s) Oral daily  metoprolol tartrate 25 milliGRAM(s) Oral two times a day  pantoprazole    Tablet 40 milliGRAM(s) Oral before breakfast  tamsulosin 0.4 milliGRAM(s) Oral at bedtime    MEDICATIONS  (PRN):  dextrose 40% Gel 15 Gram(s) Oral once PRN Blood Glucose LESS THAN 70 milliGRAM(s)/deciliter  glucagon  Injectable 1 milliGRAM(s) IntraMuscular once PRN Glucose LESS THAN 70 milligrams/deciliter      Allergies  No Known Allergies    Vital Signs Last 24 Hrs  T(C): 37.1 (21 Sep 2018 11:25), Max: 37.2 (20 Sep 2018 23:58)  T(F): 98.8 (21 Sep 2018 11:25), Max: 98.9 (20 Sep 2018 23:58)  HR: 80 (21 Sep 2018 11:25) (74 - 86)  BP: 138/96 (21 Sep 2018 11:25) (131/64 - 139/74)  BP(mean): --  RR: 17 (21 Sep 2018 11:25) (16 - 17)  SpO2: 100% (21 Sep 2018 11:25) (97% - 100%)    PHYSICAL EXAM:  GENERAL: NAD  HEAD: Atraumatic  EYES: PERRLA  NERVOUS SYSTEM:  A, O X 3, non focal  CHEST/LUNG: Clear  HEART: RRR  ABDOMEN: soft, nontender  EXTREMITIES: no edema      LABS:                        12.9   5.43  )-----------( 158      ( 21 Sep 2018 04:25 )             40.5     -    144  |  109<H>  |  14  ----------------------------<  88  4.1   |  26  |  1.11    Ca    9.1      21 Sep 2018 04:25    TPro  7.9  /  Alb  3.8  /  TBili  0.3  /  DBili  x   /  AST  30  /  ALT  25  /  AlkPhos  72  09-20    PT/INR - ( 20 Sep 2018 09:37 )   PT: 11.1 sec;   INR: 1.02 ratio         PTT - ( 20 Sep 2018 09:37 )  PTT:31.4 sec  Urinalysis Basic - ( 20 Sep 2018 09:25 )    Color: Yellow / Appearance: Clear / S.005 / pH: x  Gluc: x / Ketone: Negative  / Bili: Negative / Urobili: Negative mg/dL   Blood: x / Protein: 15 mg/dL / Nitrite: Negative   Leuk Esterase: Small / RBC: 0-2 /HPF / WBC 3-5   Sq Epi: x / Non Sq Epi: Occasional / Bacteria: Occasional      CAPILLARY BLOOD GLUCOSE      POCT Blood Glucose.: 178 mg/dL (21 Sep 2018 12:00)  POCT Blood Glucose.: 68 mg/dL (21 Sep 2018 07:49)  POCT Blood Glucose.: 97 mg/dL (20 Sep 2018 21:19)  POCT Blood Glucose.: 79 mg/dL (20 Sep 2018 16:42)      Culture - Urine (collected 20 Sep 2018 12:29)  Source: .Urine Clean Catch (Midstream)  Final Report (21 Sep 2018 11:27):    <10,000 CFU/ml Normal Urogenital manohar present      RADIOLOGY & ADDITIONAL TESTS:    Imaging Personally Reviewed:  [ ] YES  [ ] NO    Consultant(s) Notes Reviewed:  [ ] YES  [ ] NO    Care Discussed with Consultants/Other Providers [ ] YES  [ ] NO

## 2018-09-21 NOTE — PROGRESS NOTE ADULT - SUBJECTIVE AND OBJECTIVE BOX
INTERVAL HPI/OVERNIGHT EVENTS:  Uneventful.  Subjective Complaints:  Offers no complaints.  AAOX3; speech clear.  moves all extremities; in no acute distress    RECENT RADIOLOGY & ADDITIONAL TESTS:  Brain MRI normal. Echo shows a LVEF of 50-55 percent;  Carotid sonogram negative for stenosis.    NEUROLOGICAL EXAM (Pertinent):  Vital Signs Last 24 Hrs  T(C): 37.1 (21 Sep 2018 11:25), Max: 37.2 (20 Sep 2018 23:58)  T(F): 98.8 (21 Sep 2018 11:25), Max: 98.9 (20 Sep 2018 23:58)  HR: 80 (21 Sep 2018 11:25) (74 - 86)  BP: 138/96 (21 Sep 2018 11:25) (131/64 - 139/74)  BP(mean): --  RR: 17 (21 Sep 2018 11:25) (16 - 17)  SpO2: 100% (21 Sep 2018 11:25) (97% - 100%)    MEDICATIONS  (STANDING):  atorvastatin 10 milliGRAM(s) Oral at bedtime  clopidogrel Tablet 75 milliGRAM(s) Oral daily  dextrose 5%. 1000 milliLiter(s) (50 mL/Hr) IV Continuous <Continuous>  dextrose 50% Injectable 12.5 Gram(s) IV Push once  dextrose 50% Injectable 25 Gram(s) IV Push once  dextrose 50% Injectable 25 Gram(s) IV Push once  enalapril 5 milliGRAM(s) Oral daily  enoxaparin Injectable 40 milliGRAM(s) SubCutaneous daily  finasteride 5 milliGRAM(s) Oral daily  insulin glargine Injectable (LANTUS) 14 Unit(s) SubCutaneous at bedtime  insulin lispro (HumaLOG) corrective regimen sliding scale   SubCutaneous three times a day before meals  insulin lispro (HumaLOG) corrective regimen sliding scale   SubCutaneous at bedtime  isosorbide   mononitrate ER Tablet (IMDUR) 30 milliGRAM(s) Oral daily  metoprolol tartrate 25 milliGRAM(s) Oral two times a day  pantoprazole    Tablet 40 milliGRAM(s) Oral before breakfast  tamsulosin 0.4 milliGRAM(s) Oral at bedtime    MEDICATIONS  (PRN):  dextrose 40% Gel 15 Gram(s) Oral once PRN Blood Glucose LESS THAN 70 milliGRAM(s)/deciliter  glucagon  Injectable 1 milliGRAM(s) IntraMuscular once PRN Glucose LESS THAN 70 milligrams/deciliter    Assessment & Opinion:  Transient Ischemic Attack, fully resolved.  Recommendations:  EEG.   DVT prophylaxis as ordered.  PT/OT eval  Medications:  Continue current medications.  Will follow

## 2018-09-22 ENCOUNTER — TRANSCRIPTION ENCOUNTER (OUTPATIENT)
Age: 73
End: 2018-09-22

## 2018-09-22 VITALS
TEMPERATURE: 96 F | SYSTOLIC BLOOD PRESSURE: 100 MMHG | RESPIRATION RATE: 17 BRPM | HEART RATE: 68 BPM | DIASTOLIC BLOOD PRESSURE: 55 MMHG | OXYGEN SATURATION: 99 %

## 2018-09-22 LAB
CHOLEST SERPL-MCNC: 99 MG/DL — SIGNIFICANT CHANGE UP (ref 10–199)
HDLC SERPL-MCNC: 49 MG/DL — SIGNIFICANT CHANGE UP
LIPID PNL WITH DIRECT LDL SERPL: 38 MG/DL — SIGNIFICANT CHANGE UP
TOTAL CHOLESTEROL/HDL RATIO MEASUREMENT: 2 RATIO — LOW (ref 3.4–9.6)
TRIGL SERPL-MCNC: 58 MG/DL — SIGNIFICANT CHANGE UP (ref 10–149)

## 2018-09-22 PROCEDURE — 99239 HOSP IP/OBS DSCHRG MGMT >30: CPT

## 2018-09-22 RX ORDER — INSULIN GLARGINE 100 [IU]/ML
24 INJECTION, SOLUTION SUBCUTANEOUS
Qty: 0 | Refills: 0 | COMMUNITY

## 2018-09-22 RX ORDER — RANITIDINE HYDROCHLORIDE 150 MG/1
1 TABLET, FILM COATED ORAL
Qty: 0 | Refills: 0 | COMMUNITY

## 2018-09-22 RX ORDER — GLIMEPIRIDE 1 MG
1 TABLET ORAL
Qty: 0 | Refills: 0 | COMMUNITY

## 2018-09-22 RX ORDER — INSULIN GLARGINE 100 [IU]/ML
14 INJECTION, SOLUTION SUBCUTANEOUS
Qty: 0 | Refills: 0 | COMMUNITY
Start: 2018-09-22

## 2018-09-22 RX ADMIN — Medication 25 MILLIGRAM(S): at 06:39

## 2018-09-22 RX ADMIN — ISOSORBIDE MONONITRATE 30 MILLIGRAM(S): 60 TABLET, EXTENDED RELEASE ORAL at 11:34

## 2018-09-22 RX ADMIN — Medication 5 MILLIGRAM(S): at 11:34

## 2018-09-22 RX ADMIN — Medication 1: at 11:33

## 2018-09-22 RX ADMIN — Medication 2: at 07:34

## 2018-09-22 RX ADMIN — PANTOPRAZOLE SODIUM 40 MILLIGRAM(S): 20 TABLET, DELAYED RELEASE ORAL at 06:39

## 2018-09-22 RX ADMIN — CLOPIDOGREL BISULFATE 75 MILLIGRAM(S): 75 TABLET, FILM COATED ORAL at 11:34

## 2018-09-22 RX ADMIN — FINASTERIDE 5 MILLIGRAM(S): 5 TABLET, FILM COATED ORAL at 11:34

## 2018-09-22 NOTE — DISCHARGE NOTE ADULT - PATIENT PORTAL LINK FT
You can access the [facility] Patient Portal, offered by [facility], by registering with the following website: [email or web address]

## 2018-09-22 NOTE — DISCHARGE NOTE ADULT - PROVIDER TOKENS
FREE:[LAST:[primary doctor],PHONE:[(   )    -],FAX:[(   )    -]],TOKEN:'[id number]',TOKEN:'[id number]'

## 2018-09-22 NOTE — DISCHARGE NOTE ADULT - HOSPITAL COURSE
72 y/o male hx DM (lantus and glimiperide), HTN, stroke 10 years ago without residual deficits, CAD (2 stents) presented with right UE/LE weakness with slurred speech.    TIA:  - MRI head negative  - Echo and carotid doppler negative.  - Seen and evaluated by neuro  - Continue plavix. statin    Hypoglycemia:  - Resolved  - In the setting of Insulin, glimiperide    DM:  - Hb A1c 7.6  - Continue current insulin regimen  - Hold PO med    CAD:  - Continue plavix, statin  - Asymptomatic now    HTN:  - BP acceptable  - Continue current meds.    Urinary retention  - Will dc sarkar cath, TOV  - Continue Flomax Proscar.     DVT ppx:  - On lovenox

## 2018-09-22 NOTE — DISCHARGE NOTE ADULT - CARE PROVIDER_API CALL
primary doctor,   Phone: (   )    -  Fax: (   )    -    Thanh Boateng), EndocrinologyMetabDiabetes  [address]  [address]  [place] [address]  Phone: [phone]  Fax: [phone]    Gen Alexis), Urology  [address] [place] [address]  Phone: [phone]  Fax: [phone]

## 2018-09-22 NOTE — DISCHARGE NOTE ADULT - MEDICATION SUMMARY - MEDICATIONS TO CHANGE
I will SWITCH the dose or number of times a day I take the medications listed below when I get home from the hospital:    Lantus  -- 24 unit(s) subcutaneous once a day (at bedtime)

## 2018-09-22 NOTE — DISCHARGE NOTE ADULT - CARE PLAN
Principal Discharge DX:	TIA (transient ischemic attack)  Goal:	symptoms resolved  Assessment and plan of treatment:	Continue Plavix, lipitor  Blood pressure, blood glucose control  Follow up with primary doctor  Secondary Diagnosis:	Hypoglycemia  Assessment and plan of treatment:	Will hold glimepiride now  Decrease Lantus to 14 units at bedtime  Continue rest of medication  Follow up with your primary doctor/ endocrinologist in 1 wk  Secondary Diagnosis:	Type 2 diabetes mellitus without complication, with long-term current use of insulin  Assessment and plan of treatment:	Will hold glimepiride now  Decrease Lantus to 14 units at bedtime  Continue rest of medication  Follow up with your primary doctor/ endocrinologist in 1 wk  Secondary Diagnosis:	Essential hypertension  Assessment and plan of treatment:	Continue home medication  Secondary Diagnosis:	Benign prostatic hyperplasia with urinary retention  Assessment and plan of treatment:	Continue medication for BPH  Follow up with primary doctor as you had urinary retention, urology follow up

## 2018-09-22 NOTE — DISCHARGE NOTE ADULT - SECONDARY DIAGNOSIS.
Hypoglycemia Type 2 diabetes mellitus without complication, with long-term current use of insulin Essential hypertension Benign prostatic hyperplasia with urinary retention

## 2018-09-22 NOTE — DISCHARGE NOTE ADULT - PLAN OF CARE
symptoms resolved Continue Plavix, lipitor  Blood pressure, blood glucose control  Follow up with primary doctor Will hold glimepiride now  Decrease Lantus to 14 units at bedtime  Continue rest of medication  Follow up with your primary doctor/ endocrinologist in 1 wk Continue home medication Continue medication for BPH  Follow up with primary doctor as you had urinary retention, urology follow up

## 2018-09-22 NOTE — DISCHARGE NOTE ADULT - MEDICATION SUMMARY - MEDICATIONS TO TAKE
I will START or STAY ON the medications listed below when I get home from the hospital:    finasteride 5 mg oral tablet  -- 1 tab(s) by mouth once a day  -- Indication: For BPH    enalapril 5 mg oral tablet  -- 1 tab(s) by mouth once a day  -- Indication: For HTN    Flomax 0.4 mg oral capsule  -- 1 cap(s) by mouth once a day  -- Indication: For BPH    ISMO 20 mg oral tablet  -- 1 tab(s) by mouth once a day  -- Indication: For HTN    Victoza 18 mg/3 mL subcutaneous solution  -- 1.2 milliliter(s) subcutaneous  -- Indication: For DM    metFORMIN 1000 mg oral tablet  -- 1 tab(s) by mouth 2 times a day  -- Indication: For DM    insulin glargine  -- 14 unit(s) subcutaneous once a day (at bedtime)  -- Indication: For DM    atorvastatin 10 mg oral tablet  -- 1 tab(s) by mouth once a day  -- Indication: For HLD    Plavix 75 mg oral tablet  -- 1 tab(s) by mouth once a day  -- Indication: For Coronary artery disease involving native coronary artery of native heart without angina pectoris    metoprolol tartrate 25 mg oral tablet  -- 1 tab(s) by mouth 2 times a day  -- Indication: For HTN    omeprazole 20 mg oral delayed release capsule  -- 1 cap(s) by mouth once a day  -- Indication: For Reflux

## 2018-09-26 DIAGNOSIS — Z95.5 PRESENCE OF CORONARY ANGIOPLASTY IMPLANT AND GRAFT: ICD-10-CM

## 2018-09-26 DIAGNOSIS — Z83.3 FAMILY HISTORY OF DIABETES MELLITUS: ICD-10-CM

## 2018-09-26 DIAGNOSIS — Z79.4 LONG TERM (CURRENT) USE OF INSULIN: ICD-10-CM

## 2018-09-26 DIAGNOSIS — H54.42A3 BLINDNESS LEFT EYE CATEGORY 3, NORMAL VISION RIGHT EYE: ICD-10-CM

## 2018-09-26 DIAGNOSIS — I10 ESSENTIAL (PRIMARY) HYPERTENSION: ICD-10-CM

## 2018-09-26 DIAGNOSIS — G45.9 TRANSIENT CEREBRAL ISCHEMIC ATTACK, UNSPECIFIED: ICD-10-CM

## 2018-09-26 DIAGNOSIS — E11.649 TYPE 2 DIABETES MELLITUS WITH HYPOGLYCEMIA WITHOUT COMA: ICD-10-CM

## 2018-09-26 DIAGNOSIS — N40.1 BENIGN PROSTATIC HYPERPLASIA WITH LOWER URINARY TRACT SYMPTOMS: ICD-10-CM

## 2018-09-26 DIAGNOSIS — I25.10 ATHEROSCLEROTIC HEART DISEASE OF NATIVE CORONARY ARTERY WITHOUT ANGINA PECTORIS: ICD-10-CM

## 2018-09-26 DIAGNOSIS — Z86.73 PERSONAL HISTORY OF TRANSIENT ISCHEMIC ATTACK (TIA), AND CEREBRAL INFARCTION WITHOUT RESIDUAL DEFICITS: ICD-10-CM

## 2018-09-26 DIAGNOSIS — K21.9 GASTRO-ESOPHAGEAL REFLUX DISEASE WITHOUT ESOPHAGITIS: ICD-10-CM

## 2018-09-26 DIAGNOSIS — R33.8 OTHER RETENTION OF URINE: ICD-10-CM
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
## 2021-02-05 ENCOUNTER — EMERGENCY (EMERGENCY)
Facility: HOSPITAL | Age: 76
LOS: 0 days | Discharge: HOME | End: 2021-02-06
Attending: STUDENT IN AN ORGANIZED HEALTH CARE EDUCATION/TRAINING PROGRAM
Payer: MEDICARE

## 2021-02-05 VITALS
HEART RATE: 89 BPM | DIASTOLIC BLOOD PRESSURE: 70 MMHG | RESPIRATION RATE: 17 BRPM | HEIGHT: 70 IN | OXYGEN SATURATION: 99 % | WEIGHT: 199.96 LBS | SYSTOLIC BLOOD PRESSURE: 136 MMHG | TEMPERATURE: 98 F

## 2021-02-05 DIAGNOSIS — E11.9 TYPE 2 DIABETES MELLITUS WITHOUT COMPLICATIONS: ICD-10-CM

## 2021-02-05 DIAGNOSIS — Z79.01 LONG TERM (CURRENT) USE OF ANTICOAGULANTS: ICD-10-CM

## 2021-02-05 DIAGNOSIS — Z95.5 PRESENCE OF CORONARY ANGIOPLASTY IMPLANT AND GRAFT: ICD-10-CM

## 2021-02-05 DIAGNOSIS — Z79.4 LONG TERM (CURRENT) USE OF INSULIN: ICD-10-CM

## 2021-02-05 DIAGNOSIS — I25.10 ATHEROSCLEROTIC HEART DISEASE OF NATIVE CORONARY ARTERY WITHOUT ANGINA PECTORIS: ICD-10-CM

## 2021-02-05 DIAGNOSIS — N39.0 URINARY TRACT INFECTION, SITE NOT SPECIFIED: ICD-10-CM

## 2021-02-05 DIAGNOSIS — R31.0 GROSS HEMATURIA: ICD-10-CM

## 2021-02-05 DIAGNOSIS — N40.0 BENIGN PROSTATIC HYPERPLASIA WITHOUT LOWER URINARY TRACT SYMPTOMS: ICD-10-CM

## 2021-02-05 DIAGNOSIS — R31.9 HEMATURIA, UNSPECIFIED: ICD-10-CM

## 2021-02-05 DIAGNOSIS — Z79.82 LONG TERM (CURRENT) USE OF ASPIRIN: ICD-10-CM

## 2021-02-05 DIAGNOSIS — Z95.0 PRESENCE OF CARDIAC PACEMAKER: ICD-10-CM

## 2021-02-05 LAB
ALBUMIN SERPL ELPH-MCNC: 3.2 G/DL — LOW (ref 3.3–5)
ALP SERPL-CCNC: 62 U/L — SIGNIFICANT CHANGE UP (ref 40–120)
ALT FLD-CCNC: 21 U/L — SIGNIFICANT CHANGE UP (ref 12–78)
ANION GAP SERPL CALC-SCNC: 3 MMOL/L — LOW (ref 5–17)
APPEARANCE UR: ABNORMAL
APTT BLD: 35.1 SEC — SIGNIFICANT CHANGE UP (ref 27.5–35.5)
AST SERPL-CCNC: 28 U/L — SIGNIFICANT CHANGE UP (ref 15–37)
BACTERIA # UR AUTO: ABNORMAL
BASOPHILS # BLD AUTO: 0.02 K/UL — SIGNIFICANT CHANGE UP (ref 0–0.2)
BASOPHILS NFR BLD AUTO: 0.3 % — SIGNIFICANT CHANGE UP (ref 0–2)
BILIRUB SERPL-MCNC: 0.5 MG/DL — SIGNIFICANT CHANGE UP (ref 0.2–1.2)
BILIRUB UR-MCNC: NEGATIVE — SIGNIFICANT CHANGE UP
BUN SERPL-MCNC: 25 MG/DL — HIGH (ref 7–23)
CALCIUM SERPL-MCNC: 9 MG/DL — SIGNIFICANT CHANGE UP (ref 8.5–10.1)
CHLORIDE SERPL-SCNC: 110 MMOL/L — HIGH (ref 96–108)
CO2 SERPL-SCNC: 28 MMOL/L — SIGNIFICANT CHANGE UP (ref 22–31)
COLOR SPEC: ABNORMAL
CREAT SERPL-MCNC: 1.24 MG/DL — SIGNIFICANT CHANGE UP (ref 0.5–1.3)
DIFF PNL FLD: ABNORMAL
EOSINOPHIL # BLD AUTO: 0.03 K/UL — SIGNIFICANT CHANGE UP (ref 0–0.5)
EOSINOPHIL NFR BLD AUTO: 0.4 % — SIGNIFICANT CHANGE UP (ref 0–6)
GLUCOSE SERPL-MCNC: 87 MG/DL — SIGNIFICANT CHANGE UP (ref 70–99)
GLUCOSE UR QL: NEGATIVE MG/DL — SIGNIFICANT CHANGE UP
HCT VFR BLD CALC: 40.2 % — SIGNIFICANT CHANGE UP (ref 39–50)
HGB BLD-MCNC: 12.7 G/DL — LOW (ref 13–17)
IMM GRANULOCYTES NFR BLD AUTO: 0.1 % — SIGNIFICANT CHANGE UP (ref 0–1.5)
INR BLD: 1.46 RATIO — HIGH (ref 0.88–1.16)
KETONES UR-MCNC: NEGATIVE — SIGNIFICANT CHANGE UP
LEUKOCYTE ESTERASE UR-ACNC: ABNORMAL
LYMPHOCYTES # BLD AUTO: 1.85 K/UL — SIGNIFICANT CHANGE UP (ref 1–3.3)
LYMPHOCYTES # BLD AUTO: 26 % — SIGNIFICANT CHANGE UP (ref 13–44)
MCHC RBC-ENTMCNC: 29.1 PG — SIGNIFICANT CHANGE UP (ref 27–34)
MCHC RBC-ENTMCNC: 31.6 GM/DL — LOW (ref 32–36)
MCV RBC AUTO: 92 FL — SIGNIFICANT CHANGE UP (ref 80–100)
MONOCYTES # BLD AUTO: 0.83 K/UL — SIGNIFICANT CHANGE UP (ref 0–0.9)
MONOCYTES NFR BLD AUTO: 11.7 % — SIGNIFICANT CHANGE UP (ref 2–14)
NEUTROPHILS # BLD AUTO: 4.37 K/UL — SIGNIFICANT CHANGE UP (ref 1.8–7.4)
NEUTROPHILS NFR BLD AUTO: 61.5 % — SIGNIFICANT CHANGE UP (ref 43–77)
NITRITE UR-MCNC: POSITIVE
NRBC # BLD: 0 /100 WBCS — SIGNIFICANT CHANGE UP (ref 0–0)
PH UR: 6 — SIGNIFICANT CHANGE UP (ref 5–8)
PLATELET # BLD AUTO: 122 K/UL — LOW (ref 150–400)
POTASSIUM SERPL-MCNC: 4.6 MMOL/L — SIGNIFICANT CHANGE UP (ref 3.5–5.3)
POTASSIUM SERPL-SCNC: 4.6 MMOL/L — SIGNIFICANT CHANGE UP (ref 3.5–5.3)
PROT SERPL-MCNC: 7.2 GM/DL — SIGNIFICANT CHANGE UP (ref 6–8.3)
PROT UR-MCNC: 500 MG/DL
PROTHROM AB SERPL-ACNC: 16.6 SEC — HIGH (ref 10.6–13.6)
RBC # BLD: 4.37 M/UL — SIGNIFICANT CHANGE UP (ref 4.2–5.8)
RBC # FLD: 12.2 % — SIGNIFICANT CHANGE UP (ref 10.3–14.5)
RBC CASTS # UR COMP ASSIST: >50 /HPF (ref 0–4)
SODIUM SERPL-SCNC: 141 MMOL/L — SIGNIFICANT CHANGE UP (ref 135–145)
SP GR SPEC: 1.01 — SIGNIFICANT CHANGE UP (ref 1.01–1.02)
UROBILINOGEN FLD QL: 1 MG/DL
WBC # BLD: 7.11 K/UL — SIGNIFICANT CHANGE UP (ref 3.8–10.5)
WBC # FLD AUTO: 7.11 K/UL — SIGNIFICANT CHANGE UP (ref 3.8–10.5)
WBC UR QL: ABNORMAL

## 2021-02-05 PROCEDURE — 76770 US EXAM ABDO BACK WALL COMP: CPT | Mod: 26

## 2021-02-05 PROCEDURE — 99285 EMERGENCY DEPT VISIT HI MDM: CPT

## 2021-02-05 RX ORDER — SODIUM CHLORIDE 9 MG/ML
1000 INJECTION INTRAMUSCULAR; INTRAVENOUS; SUBCUTANEOUS ONCE
Refills: 0 | Status: COMPLETED | OUTPATIENT
Start: 2021-02-05 | End: 2021-02-05

## 2021-02-05 RX ORDER — CEFTRIAXONE 500 MG/1
1000 INJECTION, POWDER, FOR SOLUTION INTRAMUSCULAR; INTRAVENOUS ONCE
Refills: 0 | Status: COMPLETED | OUTPATIENT
Start: 2021-02-05 | End: 2021-02-05

## 2021-02-05 NOTE — ED PROVIDER NOTE - CLINICAL SUMMARY MEDICAL DECISION MAKING FREE TEXT BOX
75M on antiplatelet therapy w/ painful hematuria x 24 hrs in setting of sarkar placement wednesday for urinary retention 2/2 BPH. Pt w/ jarrett blood in leg bag, appears mild/moderately uncomfortable, +mild suprapubic ttp, plan for labs, US bladder, ua. Likely cystitis but w/ eval for bleeding mass/lesion in  tract. Will consider CT if US unrevealing. Will shayley have to remove sarkar and do a trial of void. Pt w/ outpt uro f/u Monday.

## 2021-02-05 NOTE — ED ADULT NURSE NOTE - CHIEF COMPLAINT QUOTE
76 y/o Male with PMH of BPH, T2DM, 3 STENTS IN HEART, Pacemaker, left eye blindness. Presents to the Ed with c/c of hematuria. Pt had a sarkar catheter placed on Wednesday and last night he saw bloody urine with mild pain inside the penis. 400ml of bloody urine output presently.

## 2021-02-05 NOTE — ED ADULT TRIAGE NOTE - CHIEF COMPLAINT QUOTE
74 y/o Male with PMH of BPH, T2DM, 3 STENTS IN HEART, Pacemaker, left eye blindness. Presents to the Ed with c/c of hematuria. Pt had a sarkar catheter placed on Wednesday and last night he saw bloody urine with mild pain inside the penis. 400ml of bloody urine output presently.

## 2021-02-05 NOTE — ED ADULT NURSE NOTE - OBJECTIVE STATEMENT
Patient presents with Pierre catheter s/p urinary retention. Patient noted with bloody urine states that he might've accidentally stepped or tugged on the drainage bag but he is not sure. Drainage bag noted with bright red bloody urine, patient c/o discomfort at the Pierre insertion site

## 2021-02-05 NOTE — ED PROVIDER NOTE - OBJECTIVE STATEMENT
75M with PMH of BPH, T2DM, 3 STENTS IN HEART on ASA & Plavix, Pacemaker, left eye blindness, p/f hematuria since last night assoc w/ penile and suprapubic in setting of having sarkar placed Wednesday by his urologist (dr. radha price) for urinary retention. Pt estimates this is the 6th time he's had to have a sarkar placed for urinary retention 2/2 BPH.

## 2021-02-05 NOTE — ED PROVIDER NOTE - CARE PLAN
Principal Discharge DX:	Hematuria, gross   Principal Discharge DX:	Hematuria, gross  Secondary Diagnosis:	UTI (urinary tract infection)

## 2021-02-05 NOTE — ED PROVIDER NOTE - PATIENT PORTAL LINK FT
You can access the FollowMyHealth Patient Portal offered by [facility] by registering at the following website: [email or web address]. By joining [facility] FollowMyHealth portal, you will also be able to view your health information using other applications (apps) compatible with our system.

## 2021-02-05 NOTE — ED PROVIDER NOTE - NSFOLLOWUPINSTRUCTIONS_ED_ALL_ED_FT
1) Please follow-up with your urologist.  If you cannot follow-up with your doctor(s), please return to the ED for any urgent issues.  2) If you have any worsening of symptoms or any other concerns please return to the ED immediately.  3) Please continue taking your home medications as directed.  4) You may have been given a copy of your labs and/or imaging.  Please go over these with your primary care doctor.   5) A prescription has been sent to your pharmacy. Please take it as directed.

## 2021-02-06 VITALS
HEART RATE: 82 BPM | DIASTOLIC BLOOD PRESSURE: 76 MMHG | TEMPERATURE: 98 F | SYSTOLIC BLOOD PRESSURE: 155 MMHG | OXYGEN SATURATION: 96 % | RESPIRATION RATE: 16 BRPM

## 2021-02-06 RX ORDER — CEFPODOXIME PROXETIL 100 MG
1 TABLET ORAL
Qty: 14 | Refills: 0
Start: 2021-02-06 | End: 2021-02-12

## 2021-02-06 RX ADMIN — CEFTRIAXONE 100 MILLIGRAM(S): 500 INJECTION, POWDER, FOR SOLUTION INTRAMUSCULAR; INTRAVENOUS at 00:24

## 2021-02-06 RX ADMIN — SODIUM CHLORIDE 1000 MILLILITER(S): 9 INJECTION INTRAMUSCULAR; INTRAVENOUS; SUBCUTANEOUS at 00:24

## 2021-02-06 RX ADMIN — SODIUM CHLORIDE 1000 MILLILITER(S): 9 INJECTION INTRAMUSCULAR; INTRAVENOUS; SUBCUTANEOUS at 01:00

## 2021-02-06 RX ADMIN — CEFTRIAXONE 1000 MILLIGRAM(S): 500 INJECTION, POWDER, FOR SOLUTION INTRAMUSCULAR; INTRAVENOUS at 00:55

## 2021-02-07 LAB
CULTURE RESULTS: NO GROWTH — SIGNIFICANT CHANGE UP
SPECIMEN SOURCE: SIGNIFICANT CHANGE UP

## 2023-03-08 ENCOUNTER — APPOINTMENT (OUTPATIENT)
Dept: CARE COORDINATION | Facility: HOME HEALTH | Age: 78
End: 2023-03-08
Payer: MEDICARE

## 2023-03-08 VITALS — HEIGHT: 70.5 IN | BODY MASS INDEX: 28.31 KG/M2 | WEIGHT: 200 LBS

## 2023-03-08 DIAGNOSIS — H54.40 BLINDNESS, ONE EYE, UNSPECIFIED EYE: ICD-10-CM

## 2023-03-08 DIAGNOSIS — E78.5 HYPERLIPIDEMIA, UNSPECIFIED: ICD-10-CM

## 2023-03-08 DIAGNOSIS — K21.9 GASTRO-ESOPHAGEAL REFLUX DISEASE W/OUT ESOPHAGITIS: ICD-10-CM

## 2023-03-08 DIAGNOSIS — E11.9 TYPE 2 DIABETES MELLITUS W/OUT COMPLICATIONS: ICD-10-CM

## 2023-03-08 DIAGNOSIS — Z86.73 PERSONAL HISTORY OF TRANSIENT ISCHEMIC ATTACK (TIA), AND CEREBRAL INFARCTION W/OUT RESIDUAL DEFICITS: ICD-10-CM

## 2023-03-08 DIAGNOSIS — E11.40 TYPE 2 DIABETES MELLITUS WITH DIABETIC NEUROPATHY, UNSPECIFIED: ICD-10-CM

## 2023-03-08 DIAGNOSIS — I20.9 ANGINA PECTORIS, UNSPECIFIED: ICD-10-CM

## 2023-03-08 DIAGNOSIS — Z95.0 PRESENCE OF CARDIAC PACEMAKER: ICD-10-CM

## 2023-03-08 DIAGNOSIS — Z95.5 PRESENCE OF CORONARY ANGIOPLASTY IMPLANT AND GRAFT: ICD-10-CM

## 2023-03-08 DIAGNOSIS — I10 ESSENTIAL (PRIMARY) HYPERTENSION: ICD-10-CM

## 2023-03-08 PROCEDURE — 99349 HOME/RES VST EST MOD MDM 40: CPT | Mod: 95

## 2023-03-08 RX ORDER — FINASTERIDE 5 MG/1
5 TABLET, FILM COATED ORAL DAILY
Qty: 90 | Refills: 0 | Status: ACTIVE | COMMUNITY
Start: 2023-03-08

## 2023-03-08 RX ORDER — GABAPENTIN 300 MG/1
300 CAPSULE ORAL AT BEDTIME
Qty: 180 | Refills: 0 | Status: ACTIVE | COMMUNITY
Start: 2023-03-08

## 2023-03-08 RX ORDER — LATANOPROST/PF 0.005 %
0.01 DROPS OPHTHALMIC (EYE)
Qty: 1 | Refills: 0 | Status: ACTIVE | COMMUNITY
Start: 2023-03-08

## 2023-03-08 RX ORDER — FLUOROMETHOLONE 1 MG/ML
0.1 SOLUTION/ DROPS OPHTHALMIC
Qty: 1 | Refills: 0 | Status: ACTIVE | COMMUNITY
Start: 2023-03-08

## 2023-03-08 RX ORDER — ISOSORBIDE DINITRATE 20 MG/1
20 TABLET ORAL DAILY
Qty: 90 | Refills: 0 | Status: ACTIVE | COMMUNITY
Start: 2023-03-08

## 2023-03-08 RX ORDER — CLOPIDOGREL BISULFATE 75 MG/1
75 TABLET, FILM COATED ORAL DAILY
Qty: 30 | Refills: 0 | Status: ACTIVE | COMMUNITY
Start: 2023-03-08

## 2023-03-08 RX ORDER — DULAGLUTIDE 1.5 MG/.5ML
1.5 INJECTION, SOLUTION SUBCUTANEOUS
Qty: 1 | Refills: 0 | Status: ACTIVE | COMMUNITY
Start: 2023-03-08

## 2023-03-08 RX ORDER — ATORVASTATIN CALCIUM 10 MG/1
10 TABLET, FILM COATED ORAL
Qty: 90 | Refills: 3 | Status: ACTIVE | COMMUNITY
Start: 2023-03-08

## 2023-03-08 RX ORDER — INSULIN GLARGINE 100 [IU]/ML
100 INJECTION, SOLUTION SUBCUTANEOUS AT BEDTIME
Qty: 1 | Refills: 0 | Status: ACTIVE | COMMUNITY
Start: 2023-03-08

## 2023-03-08 RX ORDER — METOPROLOL SUCCINATE 25 MG/1
25 TABLET, EXTENDED RELEASE ORAL TWICE DAILY
Qty: 180 | Refills: 1 | Status: ACTIVE | COMMUNITY
Start: 2023-03-08

## 2023-03-08 RX ORDER — METFORMIN HYDROCHLORIDE 1000 MG/1
1000 TABLET, COATED ORAL
Qty: 180 | Refills: 1 | Status: ACTIVE | COMMUNITY
Start: 2023-03-08

## 2023-03-08 RX ORDER — BRIMONIDINE TARTRATE, TIMOLOL MALEATE 2; 5 MG/ML; MG/ML
0.2-0.5 SOLUTION/ DROPS TOPICAL TWICE DAILY
Qty: 1 | Refills: 0 | Status: ACTIVE | COMMUNITY
Start: 2023-03-08

## 2023-04-05 NOTE — HEALTH RISK ASSESSMENT
[Independent] : managing finances [Full assistance needed] : doing laundry [No] : In the past 12 months have you used drugs other than those required for medical reasons? No [No falls in past year] : Patient reported no falls in the past year [0] : 2) Feeling down, depressed, or hopeless: Not at all (0) [PHQ-2 Negative - No further assessment needed] : PHQ-2 Negative - No further assessment needed [Patient/Caregiver unclear of wishes] : , patient/caregiver unclear of wishes [Never] : Never [FreeTextEntry5] : drop service  [Audit-CScore] : 0 [de-identified] : walk [de-identified] : Good [[id number]] : 0 [AdvancecareDate] : 03/23

## 2023-04-05 NOTE — PHYSICAL EXAM
[de-identified] : Telehealth precludes traditional, comprehensive physical exam. Patient appeared stable and alert.

## 2023-04-05 NOTE — HISTORY OF PRESENT ILLNESS
[Home] : at home, [unfilled] , at the time of the visit. [Other Location: e.g. Home (Enter Location, City,State)___] : at [unfilled] [Verbal consent obtained from patient] : the patient, [unfilled] [de-identified] : [facility] quality initiative provider note: 77 year old male with history of Angina, DM with neuropathy, GERD, HTN, HLD, CAD s/p stent, stroke 2006 no residual. Patient report doing well. No complaints offered at this time. Awake, alert and oriented x4, in no acute distress. Denies any chest pain, shortness of breath, palpitation or dizziness. Patient verified medications and medical diagnosis. Report taking medications as prescribed. \par \par Angina - patient confirm dx, controlled with Isosorbide, followed by Dr. Powers (cardio)\par DM with neuropathy - Controlled with medication, Today BS 99, followed by Dr. Papito Barlow (endo), on Gabapentin\par \par \par

## 2024-01-06 ENCOUNTER — APPOINTMENT (OUTPATIENT)
Dept: CARE COORDINATION | Facility: HOME HEALTH | Age: 79
End: 2024-01-06

## 2025-05-20 ENCOUNTER — RESULT REVIEW (OUTPATIENT)
Age: 80
End: 2025-05-20

## 2025-05-20 ENCOUNTER — INPATIENT (INPATIENT)
Facility: HOSPITAL | Age: 80
LOS: 2 days | Discharge: HOME | End: 2025-05-23
Attending: INTERNAL MEDICINE | Admitting: INTERNAL MEDICINE
Payer: MEDICARE

## 2025-05-20 VITALS
DIASTOLIC BLOOD PRESSURE: 89 MMHG | OXYGEN SATURATION: 99 % | HEART RATE: 84 BPM | RESPIRATION RATE: 18 BRPM | WEIGHT: 199.96 LBS | SYSTOLIC BLOOD PRESSURE: 173 MMHG | HEIGHT: 70 IN | TEMPERATURE: 98 F

## 2025-05-20 DIAGNOSIS — Z95.5 PRESENCE OF CORONARY ANGIOPLASTY IMPLANT AND GRAFT: Chronic | ICD-10-CM

## 2025-05-20 DIAGNOSIS — I25.10 ATHEROSCLEROTIC HEART DISEASE OF NATIVE CORONARY ARTERY WITHOUT ANGINA PECTORIS: ICD-10-CM

## 2025-05-20 DIAGNOSIS — R55 SYNCOPE AND COLLAPSE: ICD-10-CM

## 2025-05-20 DIAGNOSIS — N40.0 BENIGN PROSTATIC HYPERPLASIA WITHOUT LOWER URINARY TRACT SYMPTOMS: ICD-10-CM

## 2025-05-20 DIAGNOSIS — E78.5 HYPERLIPIDEMIA, UNSPECIFIED: ICD-10-CM

## 2025-05-20 DIAGNOSIS — I10 ESSENTIAL (PRIMARY) HYPERTENSION: ICD-10-CM

## 2025-05-20 DIAGNOSIS — E11.65 TYPE 2 DIABETES MELLITUS WITH HYPERGLYCEMIA: ICD-10-CM

## 2025-05-20 DIAGNOSIS — N39.0 URINARY TRACT INFECTION, SITE NOT SPECIFIED: ICD-10-CM

## 2025-05-20 LAB
ALBUMIN SERPL ELPH-MCNC: 2.9 G/DL — LOW (ref 3.3–5)
ALP SERPL-CCNC: 96 U/L — SIGNIFICANT CHANGE UP (ref 40–120)
ALT FLD-CCNC: 34 U/L — SIGNIFICANT CHANGE UP (ref 12–78)
ANION GAP SERPL CALC-SCNC: 0 MMOL/L — LOW (ref 5–17)
APPEARANCE UR: CLEAR — SIGNIFICANT CHANGE UP
APTT BLD: 21.5 SEC — LOW (ref 26.1–36.8)
AST SERPL-CCNC: 39 U/L — HIGH (ref 15–37)
BASOPHILS # BLD AUTO: 0.02 K/UL — SIGNIFICANT CHANGE UP (ref 0–0.2)
BASOPHILS NFR BLD AUTO: 0.3 % — SIGNIFICANT CHANGE UP (ref 0–2)
BILIRUB SERPL-MCNC: 0.4 MG/DL — SIGNIFICANT CHANGE UP (ref 0.2–1.2)
BILIRUB UR-MCNC: NEGATIVE — SIGNIFICANT CHANGE UP
BUN SERPL-MCNC: 15 MG/DL — SIGNIFICANT CHANGE UP (ref 7–23)
CALCIUM SERPL-MCNC: 8.6 MG/DL — SIGNIFICANT CHANGE UP (ref 8.5–10.1)
CHLORIDE SERPL-SCNC: 112 MMOL/L — HIGH (ref 96–108)
CO2 SERPL-SCNC: 25 MMOL/L — SIGNIFICANT CHANGE UP (ref 22–31)
COLOR SPEC: YELLOW — SIGNIFICANT CHANGE UP
CREAT SERPL-MCNC: 1.27 MG/DL — SIGNIFICANT CHANGE UP (ref 0.5–1.3)
DIFF PNL FLD: ABNORMAL
EGFR: 57 ML/MIN/1.73M2 — LOW
EGFR: 57 ML/MIN/1.73M2 — LOW
EOSINOPHIL # BLD AUTO: 0.05 K/UL — SIGNIFICANT CHANGE UP (ref 0–0.5)
EOSINOPHIL NFR BLD AUTO: 0.8 % — SIGNIFICANT CHANGE UP (ref 0–6)
FLUAV AG NPH QL: SIGNIFICANT CHANGE UP
FLUBV AG NPH QL: SIGNIFICANT CHANGE UP
GLUCOSE SERPL-MCNC: 217 MG/DL — HIGH (ref 70–99)
GLUCOSE UR QL: 500 MG/DL
HCT VFR BLD CALC: 39.6 % — SIGNIFICANT CHANGE UP (ref 39–50)
HGB BLD-MCNC: 12.5 G/DL — LOW (ref 13–17)
IMM GRANULOCYTES NFR BLD AUTO: 0.2 % — SIGNIFICANT CHANGE UP (ref 0–0.9)
INR BLD: 0.96 RATIO — SIGNIFICANT CHANGE UP (ref 0.85–1.16)
KETONES UR QL: NEGATIVE MG/DL — SIGNIFICANT CHANGE UP
LEUKOCYTE ESTERASE UR-ACNC: NEGATIVE — SIGNIFICANT CHANGE UP
LYMPHOCYTES # BLD AUTO: 2.31 K/UL — SIGNIFICANT CHANGE UP (ref 1–3.3)
LYMPHOCYTES # BLD AUTO: 38.5 % — SIGNIFICANT CHANGE UP (ref 13–44)
MAGNESIUM SERPL-MCNC: 1.7 MG/DL — SIGNIFICANT CHANGE UP (ref 1.6–2.6)
MCHC RBC-ENTMCNC: 29.3 PG — SIGNIFICANT CHANGE UP (ref 27–34)
MCHC RBC-ENTMCNC: 31.6 G/DL — LOW (ref 32–36)
MCV RBC AUTO: 93 FL — SIGNIFICANT CHANGE UP (ref 80–100)
MONOCYTES # BLD AUTO: 0.6 K/UL — SIGNIFICANT CHANGE UP (ref 0–0.9)
MONOCYTES NFR BLD AUTO: 10 % — SIGNIFICANT CHANGE UP (ref 2–14)
NEUTROPHILS # BLD AUTO: 3.01 K/UL — SIGNIFICANT CHANGE UP (ref 1.8–7.4)
NEUTROPHILS NFR BLD AUTO: 50.2 % — SIGNIFICANT CHANGE UP (ref 43–77)
NITRITE UR-MCNC: NEGATIVE — SIGNIFICANT CHANGE UP
NRBC BLD AUTO-RTO: 0 /100 WBCS — SIGNIFICANT CHANGE UP (ref 0–0)
NT-PROBNP SERPL-SCNC: 381 PG/ML — SIGNIFICANT CHANGE UP (ref 0–450)
PH UR: 8 — SIGNIFICANT CHANGE UP (ref 5–8)
PLATELET # BLD AUTO: 142 K/UL — LOW (ref 150–400)
POTASSIUM SERPL-MCNC: 4.6 MMOL/L — SIGNIFICANT CHANGE UP (ref 3.5–5.3)
POTASSIUM SERPL-SCNC: 4.6 MMOL/L — SIGNIFICANT CHANGE UP (ref 3.5–5.3)
PROT SERPL-MCNC: 6.7 GM/DL — SIGNIFICANT CHANGE UP (ref 6–8.3)
PROT UR-MCNC: >=1000 MG/DL
PROTHROM AB SERPL-ACNC: 10.8 SEC — SIGNIFICANT CHANGE UP (ref 9.9–13.4)
RBC # BLD: 4.26 M/UL — SIGNIFICANT CHANGE UP (ref 4.2–5.8)
RBC # FLD: 13 % — SIGNIFICANT CHANGE UP (ref 10.3–14.5)
RSV RNA NPH QL NAA+NON-PROBE: SIGNIFICANT CHANGE UP
SARS-COV-2 RNA SPEC QL NAA+PROBE: SIGNIFICANT CHANGE UP
SODIUM SERPL-SCNC: 137 MMOL/L — SIGNIFICANT CHANGE UP (ref 135–145)
SOURCE RESPIRATORY: SIGNIFICANT CHANGE UP
SP GR SPEC: 1.03 — SIGNIFICANT CHANGE UP (ref 1–1.03)
TROPONIN I, HIGH SENSITIVITY RESULT: 124.3 NG/L — HIGH
TROPONIN I, HIGH SENSITIVITY RESULT: 135.1 NG/L — HIGH
UROBILINOGEN FLD QL: 1 MG/DL — SIGNIFICANT CHANGE UP (ref 0.2–1)
WBC # BLD: 6 K/UL — SIGNIFICANT CHANGE UP (ref 3.8–10.5)
WBC # FLD AUTO: 6 K/UL — SIGNIFICANT CHANGE UP (ref 3.8–10.5)

## 2025-05-20 PROCEDURE — 71045 X-RAY EXAM CHEST 1 VIEW: CPT | Mod: 26

## 2025-05-20 PROCEDURE — 93306 TTE W/DOPPLER COMPLETE: CPT | Mod: 26

## 2025-05-20 PROCEDURE — 93010 ELECTROCARDIOGRAM REPORT: CPT

## 2025-05-20 PROCEDURE — 99285 EMERGENCY DEPT VISIT HI MDM: CPT

## 2025-05-20 PROCEDURE — 70450 CT HEAD/BRAIN W/O DYE: CPT | Mod: 26

## 2025-05-20 PROCEDURE — 72125 CT NECK SPINE W/O DYE: CPT | Mod: 26

## 2025-05-20 PROCEDURE — 99223 1ST HOSP IP/OBS HIGH 75: CPT

## 2025-05-20 RX ORDER — FINASTERIDE 1 MG/1
5 TABLET, FILM COATED ORAL DAILY
Refills: 0 | Status: DISCONTINUED | OUTPATIENT
Start: 2025-05-20 | End: 2025-05-23

## 2025-05-20 RX ORDER — DULAGLUTIDE 4.5 MG/.5ML
1.5 INJECTION, SOLUTION SUBCUTANEOUS
Refills: 0 | DISCHARGE

## 2025-05-20 RX ORDER — GLUCAGON 3 MG/1
1 POWDER NASAL ONCE
Refills: 0 | Status: DISCONTINUED | OUTPATIENT
Start: 2025-05-20 | End: 2025-05-23

## 2025-05-20 RX ORDER — DEXTROSE 50 % IN WATER 50 %
25 SYRINGE (ML) INTRAVENOUS ONCE
Refills: 0 | Status: DISCONTINUED | OUTPATIENT
Start: 2025-05-20 | End: 2025-05-23

## 2025-05-20 RX ORDER — MAGNESIUM, ALUMINUM HYDROXIDE 200-200 MG
30 TABLET,CHEWABLE ORAL EVERY 4 HOURS
Refills: 0 | Status: DISCONTINUED | OUTPATIENT
Start: 2025-05-20 | End: 2025-05-23

## 2025-05-20 RX ORDER — ASPIRIN 325 MG
1 TABLET ORAL
Refills: 0 | DISCHARGE

## 2025-05-20 RX ORDER — INSULIN GLARGINE-YFGN 100 [IU]/ML
26 INJECTION, SOLUTION SUBCUTANEOUS
Refills: 0 | DISCHARGE

## 2025-05-20 RX ORDER — SODIUM CHLORIDE 9 G/1000ML
1000 INJECTION, SOLUTION INTRAVENOUS
Refills: 0 | Status: DISCONTINUED | OUTPATIENT
Start: 2025-05-20 | End: 2025-05-23

## 2025-05-20 RX ORDER — ASPIRIN 325 MG
81 TABLET ORAL DAILY
Refills: 0 | Status: DISCONTINUED | OUTPATIENT
Start: 2025-05-20 | End: 2025-05-23

## 2025-05-20 RX ORDER — ISOSORBIDE MONONITRATE 60 MG/1
30 TABLET, EXTENDED RELEASE ORAL DAILY
Refills: 0 | Status: DISCONTINUED | OUTPATIENT
Start: 2025-05-20 | End: 2025-05-22

## 2025-05-20 RX ORDER — CLOPIDOGREL BISULFATE 75 MG/1
75 TABLET, FILM COATED ORAL DAILY
Refills: 0 | Status: DISCONTINUED | OUTPATIENT
Start: 2025-05-20 | End: 2025-05-23

## 2025-05-20 RX ORDER — CEFTRIAXONE 500 MG/1
1000 INJECTION, POWDER, FOR SOLUTION INTRAMUSCULAR; INTRAVENOUS EVERY 24 HOURS
Refills: 0 | Status: DISCONTINUED | OUTPATIENT
Start: 2025-05-20 | End: 2025-05-22

## 2025-05-20 RX ORDER — TAMSULOSIN HYDROCHLORIDE 0.4 MG/1
0.4 CAPSULE ORAL AT BEDTIME
Refills: 0 | Status: DISCONTINUED | OUTPATIENT
Start: 2025-05-20 | End: 2025-05-23

## 2025-05-20 RX ORDER — INSULIN LISPRO 100 U/ML
INJECTION, SOLUTION INTRAVENOUS; SUBCUTANEOUS
Refills: 0 | Status: DISCONTINUED | OUTPATIENT
Start: 2025-05-20 | End: 2025-05-23

## 2025-05-20 RX ORDER — ACETAMINOPHEN 500 MG/5ML
650 LIQUID (ML) ORAL EVERY 6 HOURS
Refills: 0 | Status: DISCONTINUED | OUTPATIENT
Start: 2025-05-20 | End: 2025-05-23

## 2025-05-20 RX ORDER — DEXTROSE 50 % IN WATER 50 %
12.5 SYRINGE (ML) INTRAVENOUS ONCE
Refills: 0 | Status: DISCONTINUED | OUTPATIENT
Start: 2025-05-20 | End: 2025-05-23

## 2025-05-20 RX ORDER — ATORVASTATIN CALCIUM 80 MG/1
10 TABLET, FILM COATED ORAL AT BEDTIME
Refills: 0 | Status: DISCONTINUED | OUTPATIENT
Start: 2025-05-20 | End: 2025-05-23

## 2025-05-20 RX ORDER — ONDANSETRON HCL/PF 4 MG/2 ML
4 VIAL (ML) INJECTION EVERY 8 HOURS
Refills: 0 | Status: DISCONTINUED | OUTPATIENT
Start: 2025-05-20 | End: 2025-05-23

## 2025-05-20 RX ORDER — DEXTROSE 50 % IN WATER 50 %
15 SYRINGE (ML) INTRAVENOUS ONCE
Refills: 0 | Status: DISCONTINUED | OUTPATIENT
Start: 2025-05-20 | End: 2025-05-23

## 2025-05-20 RX ORDER — INSULIN GLARGINE-YFGN 100 [IU]/ML
26 INJECTION, SOLUTION SUBCUTANEOUS AT BEDTIME
Refills: 0 | Status: DISCONTINUED | OUTPATIENT
Start: 2025-05-20 | End: 2025-05-23

## 2025-05-20 RX ORDER — FUROSEMIDE 10 MG/ML
40 INJECTION INTRAMUSCULAR; INTRAVENOUS DAILY
Refills: 0 | Status: DISCONTINUED | OUTPATIENT
Start: 2025-05-20 | End: 2025-05-23

## 2025-05-20 RX ORDER — METOPROLOL SUCCINATE 50 MG/1
25 TABLET, EXTENDED RELEASE ORAL
Refills: 0 | Status: DISCONTINUED | OUTPATIENT
Start: 2025-05-20 | End: 2025-05-22

## 2025-05-20 RX ORDER — MELATONIN 5 MG
3 TABLET ORAL AT BEDTIME
Refills: 0 | Status: DISCONTINUED | OUTPATIENT
Start: 2025-05-20 | End: 2025-05-23

## 2025-05-20 RX ADMIN — CEFTRIAXONE 100 MILLIGRAM(S): 500 INJECTION, POWDER, FOR SOLUTION INTRAMUSCULAR; INTRAVENOUS at 18:30

## 2025-05-20 RX ADMIN — FUROSEMIDE 40 MILLIGRAM(S): 10 INJECTION INTRAMUSCULAR; INTRAVENOUS at 18:30

## 2025-05-20 NOTE — GOALS OF CARE CONVERSATION - ADVANCED CARE PLANNING - CONVERSATION DETAILS
Code status is full code. Would want chest compressions and intubation if needed. Wants all life-sustaining measures. No limitations on care at this time. . Wants son, Alexis Barrett ([phone]) to make his medical decisions for him if he were to become unable to do so on his own.

## 2025-05-20 NOTE — H&P ADULT - TIME BILLING
coordination of care with ER physician, ER RN, and pharmacist, obtaining history, performing a physical examination, reviewing and interpreting labs and imaging, ordering further studies and tests, explaining the diagnosis and treatment plan to patient and son at bedside, completing medication reconciliation, and documentation as above. coordination of care with ER physician and ER RN, obtaining history, performing a physical examination, reviewing and interpreting labs and imaging, ordering further studies and tests, explaining the diagnosis and treatment plan to patient and son at bedside, completing medication reconciliation, and documentation as above.

## 2025-05-20 NOTE — H&P ADULT - ASSESSMENT
Neel Barrett is an 79 year old male with PMHx of HTN, HLD, CAD (s/p PCI), s/p PPM placement, IDDM2, and BPH who presented to the ED on 5/20/25 for complaints of passing out and admitted for syncope, need to r/o new onset CHF.    Syncope, need to r/o new onset CHF  Son reports patient fell down two steps on staircase, landed on his back, and passed out, presumed LOC as patient does not recall episode  No tongue biting, urinary or bowel incontinence, no prior episodes in the past  Associated shortness of breath x 4 days, along with orthopnea, paroxysmal nocturnal dyspnea, bendopnea, and leg swelling, admits to eating high sodium diet  EKG without signs of ischemia  Troponin 124.3 then 135.1, pro-, COVID/influenza/RSV negative  CT head without acute intracranial hemorrhage but with prominent b/l frontal parietal extra-axial spaces 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 volume loss  CT C-spine without acute fracture or traumatic subluxation but with multi-level degenerative changes  CXR without acute finding  Empirically started on furosemide 40 mg IV daily for now, strict Is and Os, daily weights, low salt diet, 2L fluid restriction  F/u serial troponins, echocardiogram  Monitor renal function and electrolytes while on IV diuresis  Telemetry    Concern for UTI  Reports dysuria, urinary frequency, and malodorous urine x 2 weeks  Afebrile, no leukocytosis  U/A with proteinuria, trace blood, negative leuks, negative nitrites, WBC 2, RBC 15, glucosuria  Ceftriaxone 1 g IV started despite WBC 2 on U/A given symptomatic  F/u urine culture      Chronic medical conditions:  HTN: PTA metoprolol tartrate 25 mg BID  HLD: PTA atorvastatin 10 mg  CAD (s/p PCI): PTA ASA 81 mg, clopidogrel 75 mg, isosorbide mononitrate 30 mg  IDDM2 with hyperglycemia: last known A1c 7.6 (on 9/21/18), blood glucose 217 on admission, POC qac and qhs, PTA Toujeo 26 U qhs reordered as Lantus 26 U qhs after discussion with pharmacist, PTA metformin 1000 mg BID and Trulicity 1.5 mg weekly held, low dose SSI qac started, blood glucose goal < 180, f/u A1c  BPH: PTA tamsulosin 0.4 mg, finasteride 5 mg  Normocytic anemia, chronic?: hgb 12.5 on admission, unknown baseline but hgb 12.7 (on 2/5/21), no signs of active bleeding  Thrombocytopenia, chronic?: plts 142K on admission, unknown baseline plts but plts 122K (on 2/5/21), no signs of active bleeding    Medication reconciliation completed using med list provided by son at bedside.    Plan of care discussed with son at bedside. Neel Barrett is an 79 year old male with PMHx of HTN, HLD, CAD (s/p PCI), s/p PPM placement, IDDM2, and BPH who presented to the ED on 5/20/25 for complaints of passing out and admitted for syncope, need to r/o new onset CHF.    Syncope, need to r/o new onset CHF  Son reports patient fell down two steps on staircase, landed on his back, and passed out, presumed LOC as patient does not recall episode  No tongue biting, urinary or bowel incontinence, no prior episodes in the past  Associated shortness of breath x 4 days, along with orthopnea, paroxysmal nocturnal dyspnea, bendopnea, and leg swelling, admits to eating high sodium diet  EKG without signs of ischemia  Troponin 124.3 then 135.1, pro-, COVID/influenza/RSV negative  CT head without acute intracranial hemorrhage but with prominent b/l frontal parietal extra-axial spaces 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 volume loss  CT C-spine without acute fracture or traumatic subluxation but with multi-level degenerative changes  CXR without acute finding  Empirically started on furosemide 40 mg IV daily for now, strict Is and Os, daily weights, low salt diet, 2L fluid restriction  F/u serial troponins, echocardiogram  Monitor renal function and electrolytes while on IV diuresis  Telemetry    Concern for UTI  Reports dysuria, urinary frequency, and malodorous urine x 2 weeks  Afebrile, no leukocytosis  U/A with proteinuria, trace blood, negative leuks, negative nitrites, WBC 2, RBC 15, glucosuria  Ceftriaxone 1 g IV started despite WBC 2 on U/A given symptomatic  F/u urine culture      Chronic medical conditions:  HTN: PTA metoprolol tartrate 25 mg BID  HLD: PTA atorvastatin 10 mg  CAD (s/p PCI): PTA ASA 81 mg, clopidogrel 75 mg, isosorbide mononitrate 30 mg  IDDM2 with hyperglycemia: last known A1c 7.6 (on 9/21/18), blood glucose 217 on admission, POC qac and qhs, PTA Lantus 26 U qhs, PTA metformin 1000 mg BID and Trulicity 1.5 mg weekly held, low dose SSI qac started, blood glucose goal < 180, f/u A1c  BPH: PTA tamsulosin 0.4 mg, finasteride 5 mg  Normocytic anemia, chronic?: hgb 12.5 on admission, unknown baseline but hgb 12.7 (on 2/5/21), no signs of active bleeding  Thrombocytopenia, chronic?: plts 142K on admission, unknown baseline plts but plts 122K (on 2/5/21), no signs of active bleeding    Medication reconciliation completed using med list provided by son at bedside.    Plan of care discussed with son at bedside.

## 2025-05-20 NOTE — ED ADULT NURSE NOTE - NSFALLHARMRISKINTERV_ED_ALL_ED

## 2025-05-20 NOTE — H&P ADULT - NSICDXPASTMEDICALHX_GEN_ALL_CORE_FT
PAST MEDICAL HISTORY:  BPH (benign prostatic hyperplasia)     CAD S/P percutaneous coronary angioplasty     HLD (hyperlipidemia)     HTN (Hypertension)     Insulin dependent type 2 diabetes mellitus

## 2025-05-20 NOTE — ED ADULT NURSE NOTE - ED STAT RN HANDOFF DETAILS
report given HOLLIE Clemons. Report endorsed to oncoming RN. Safety checks compld this shift/Safety rounds completed hourly.  IV site checked Q2+remains WDL. Meds given as ord with no s/s of adverse RXNs. Fall +skin precs in place. Any issues endorsed to oncoming RN for follow up.

## 2025-05-20 NOTE — H&P ADULT - HISTORY OF PRESENT ILLNESS
Neel Barrett is an 79 year old male with PMHx of HTN, HLD, CAD (s/p PCI), s/p PPM placement, IDDM2, and BPH who presented to the ED on 5/20/25 for complaints of passing out.     History primarily obtained from son at bedside as patient does not recall episode. As per son at bedside, patient informed him that he fell last night. So son reviewed camera footage from inside the house. Patient himself thought he fell off his bed but camera caught him falling down two steps on the staircase. Landed on his back. Stayed on the floor until his wife helped him up. Assumed LOC since patient does not recall event. No tongue biting, urinary or bowel incontinence. No prior episodes in the past. In addition, states he has been feeling short of breath for the past four days. Associated orthopnea, paroxysmal nocturnal dyspnea, bendopnea, and leg swelling. Sleeps with 1 pillow. Admits to eating high sodium diet. Also has been having dysuria, urinary frequency, and malodorous urine for the past two weeks. 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 Lives at home with wife and daughter.    In the ED, VSS except BP as elevated as 173/89. Hgb 12.5, plts 142K, blood glucose 217. Troponin 124.3 then 135.1. Pro-. U/A with proteinuria, trace blood, negative leuks, negative nitrites, WBC 2, RBC 15, glucosuria. COVID/influenza/RSV negative. CT head without acute intracranial hemorrhage but with prominent bilateral frontal parietal extra-axial spaces 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 volume loss. CT C-spine without acute fracture or traumatic subluxation but with multi-level degenerative changes. CXR without acute finding. Did not receive any medications.

## 2025-05-20 NOTE — ED ADULT NURSE NOTE - OBJECTIVE STATEMENT
Patient is an 79 years old male, alert & oriented x 4 brought in by his son for evaluation after a fall yesterday. Patient verbalized, " my son told me that I fell down the steps. I don't remember because I passed out.." Patient fell backwards as per son. (+) Headache. (+) Swollen RLE. Denies chest pain, dizziness, lightheadedness.   PMHX: HTN, DM, CAD ( stents), Pacemaker, BPH, Cholelithiasis.

## 2025-05-20 NOTE — ED ADULT NURSE NOTE - ABDOMEN
----- Message from Silvia Elam sent at 3/11/2021  3:06 PM CST -----  Regarding: Changes in condition  Contact: Blas  Patient is scheduled to see Dr. Do in June but he says something is changing and he would like to see if he can set up a virtual appt with either of you to speak to him.  He can be reached at [phone].       soft/nondistended

## 2025-05-20 NOTE — H&P ADULT - NSICDXFAMILYHX_GEN_ALL_CORE_FT
FAMILY HISTORY:  Father  Still living? Unknown  Family history of diabetes mellitus in father, Age at diagnosis: Age Unknown    Mother  Still living? Unknown  Family history of diabetes mellitus in father, Age at diagnosis: Age Unknown

## 2025-05-20 NOTE — ED ADULT NURSE NOTE - CHIEF COMPLAINT QUOTE
as per son, pt  fell yesterday going down the stairs, landed on his back . pt does not  remember how he fel, unsure what happened. c/o headache and right lower leg swelling.  takes aspirin and Plavix daily.  pt also c/o burning with urination. history of dm, eye issues, pace maker, prostate cancer and cardiac stents. fs 230

## 2025-05-20 NOTE — ED ADULT TRIAGE NOTE - CHIEF COMPLAINT QUOTE
as per son, pt  fell yesterday going down the stairs, landed on his back . pt does not  remember how he fel, unsure what happened. c/o headache and right lower leg swelling.  takes aspirin daily.  pt also c/o burning with urination. history of dm, eye issues, pace maker, prostate cancer and cardiac stents. fs 230 as per son, pt  fell yesterday going down the stairs, landed on his back . pt does not  remember how he fel, unsure what happened. c/o headache and right lower leg swelling.  takes aspirin and Plavix daily.  pt also c/o burning with urination. history of dm, eye issues, pace maker, prostate cancer and cardiac stents. fs 230

## 2025-05-20 NOTE — H&P ADULT - NSHPLABSRESULTS_GEN_ALL_CORE
12.5   6.00  )-----------( 142      ( 20 May 2025 10:21 )             39.6     137  |  112[H]  |  15  ----------------------------<  217[H]       4.6   |  25  |  1.27    Ca    8.6      20 May 2025 10:21  Mg     1.7         TPro  6.7  /  Alb  2.9[L]  /  TBili  0.4  /  DBili  x   /  AST  39[H]  /  ALT  34  /  AlkPhos  96      PT/INR: 10.8/0.96 (25 @ 10:21)  PTT: 21.5 (25 @ 10:21)    Pro-BNP: 381 (25 @ 10:21)    Urinalysis Basic - ( 20 May 2025 10:51 )  Color: Yellow / Appearance: Clear / S.026 / pH: 8.0  Gluc: 500 mg/dL / Ketone: x  / Bili: Negative / Urobili: 1.0 mg/dL   Blood: Trace / Protein: >=1000 mg/dL / Nitrite: Negative   Leuk Esterase: Negative / RBC: 15 /HPF / WBC 2 /HPF   Sq Epi: x / Bacteria: x  Hyaline Casts: x/WBC Casts: x    Urinalysis with Rflx Culture (collected 20 May 2025 10:51)    CT head without contrast 25  IMPRESSION:  1.  No acute intracranial hemorrhage or midline shift.  2.  Prominent bilateral frontal parietal extra-axial spaces 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 volume loss.    CT C-spine without contrast 25  IMPRESSION:  No acute fracture or traumatic subluxation.    Multi-level degenerative changes.    Chest x-ray 25  IMPRESSION:  Left-sided defibrillator new since 2018. No acute finding.

## 2025-05-20 NOTE — ED CLERICAL - DIVISION
[facility]... [Normal Conjunctiva] : normal conjunctiva [Normal] : alert and oriented, normal memory

## 2025-05-20 NOTE — ED ADULT NURSE NOTE - NS ED NURSE LEVEL OF CONSCIOUSNESS MENTAL STATUS
6/12/19  Had a seizure last week, s/w ER doctor, lasted 5 minutes, similar to previous seizures. No diastat administered. No illnesses or missed meds. Suggested starting clorazepate and no subsequent seizures.     3/26/19  History obtained with assistance of . No seizures since increasing the zonisamide, still with distrubed sleep, sleep at 5AM and wakes up at 2 pm. No other side effects.      3/7/19  Since last visit, taking zonisamide.taking 200mg BID. Seizure, free since last visit. Previously had seizures on this dose that lasted a few seconds, gaze deviation. He was able to converse at that time. He has no LOC with seizures. He is doing well otherwise. On higher dose of 600mg daily, was too sleepy.       1/3/19  Mother here for EEG result and medication discussion. Mother needs new forms filled out for the school (seizure action plan, med administration form).  Mother changed timing of zonisamide administration to give it 12 hours apart. He has improved. Had a seizure yesterday, similar to previous seizures, lasted 8 seconds, no LOC this time, mother believes from sleep deprivation.      12/4/18  Seen urgently as having more seizures, started on clorazepate, none since. He's been sleep deprived but no other triggers. 4-5 hours of sleep. Last about 20 seconds, symmetric, ?gaze version to the left, has an aura.      9/18/18  Since last visit, had a seizure in July. lasted 3 minutes. After taking Zonisamide, 10 minutes of bad mood. No illness at the time. He sometimes hides the medication and mother believes he might have not taken his medication. Since supervising him taking medications, no missed doses.     6/20/18  Seen urgently for a seizure last night, was seen in the ER. Stiff, eyes rolled up, unresponsive, hypersalivation, lasted 3 minutes. Afterward had urinary incontinence, red spots all over his body, had chills, tired and sleepy afterward. In ER, blood work was normal per mother. Has  had insomnia as a side effect of zonisamide, falls asleep around 2-3 AM. Behavior has been better on Zonisamide. Also doing better in school with his behavior.      Initial history:  Jasbir is a 14 yo boy with Autism Spectrum Disorder here for evaluation of seizures. He's had a total of 3 episodes starting in November, last one in March. He was started on Topiramate, became more aggressive, diarrhea, conjunctivitis. It was discontinued after 2 weeks. He had another seizure in April. He was started on Zonisamide He is concentrating better on the medication and behavior is better. He takes 300mg at bedtime. He's more emotional on this dose right after dosing. He did better on 200mg QHS. He did not have seizures on 200mg QHS. Seizures described as tonic clonic, first with eyes closed, perioral cyanosis, lasted 4 minutes. Second one was also tonic clonic, lasted 1 minute with eyes open and gaze fixed to the right. Third lasted 30 seconds, also tonic clonic, eyes open, looking to the right. He's been on Abililfy since age 7.      Review of Systems     All other systems reviewed and negative.      Allergies  No Known Drug Allergies     Active Problems  Seizure (R56.9)     Past Medical History     Past medical history was reviewed. Patient has never been hospitalized.         Birth History: Noncontributory.      Surgical History  No surgical history      Family History  Mother   Family history of diabetes mellitus (Z83.3)  The Patient/Family denies a family history of ADD / ADHD, autistic disorder, cerebral palsy, deaths at an early age, delayed milestones, having difficulty in school, migraine headache, seizures, sleep disorders, Tourettes / tic, birth defect, bleeding disorder, cancer, cognitive disorder, asthma, bipolar disorder, brain aneurysm / stroke, chronic disabling disease, depression, OCD, thyroid disorders, genetic diseases, heart disease and kidney disease.   family medical history was reviewed.      Social  History  He lives with his mother, father, sister and 2 brothers. dad works outside the home. mom stays home.         Developmental Milestones  Developmental Milestones:   developmental milestones delayed.               Physical Exam  Below exam from previous visit as mother here without patient today.     General: No acute distress.   HEENT: NCAT. Mucus membranes moist. No dysmorphic features  Resp: No flaring/retractions.  CV: Normal perfusion, no edema.  GI: Soft, nontender, no masses.  Skin: No neurocutaneous markings.  MS: Normal range of motion. No contractures.  Psych: Aggressive, not cooperative     Neurological Exam:  Mental Status:                   -Awake, alert                  -Nonverbal. Follows simple commands.        Cranial Nerves:                  -I: deferred.                  -II: VF intact to confrontation. Optic disks could not be visualized as patient not cooperative                  -III, IV, VI: EOMI. PERRL.                  -V: not cooperative                  -VII: Face/smile symmetric.                   -VIII: Hearing intact grossly                  -IX, X: not cooperative                  -XI: not cooperative                  -XII: not cooperative  Motor:                   -hypotonic                  -moves all limbs symmetrically against gravity     Sensation:                   -Intact to light touch     DTRs:                    -UE: 2/4 in triceps, biceps, brachioradialis.                   -LE: 2/4 in patella, Achilles. No clonus.                    -Toes downgoing bilaterally.     Coordination and Gait:                   -reaches for objects without ataxia or dysmetria                  -Normal narrow-based gait.       Discussion/Summary     Impression:. 14 yo male with clinical presentation suggestive of focal seizures, although he failed multiple EEG attempts due to aggression. He failed Topiramate due to side effects. Breakthrough seizure on Zonisamide 400mg Qday. Breakthrough  seizure on 200:250. CBC/CMP normal. Zonisamide level therapeutic, see EMR. EEG normal.   Patient Discussion:   -discussed that increasing zonisamide unlikely to control seizures as the dose has been optimized. Suggest adding Trileptal which also has mood stabilizing effects. Mother in agreement, will start with 300mg BID, Rx sent to pharmacy  -decrease clorazepate to 1 tab qday, d/c after 1 week if seizure free.  -will attempt to wean zonisamide if seizure free  -try higher dose of melatonin (20mg) before bedtime, give sooner so he falls asleep sooner  -already has Diastat that was prescribed by PMD, mother to call with Rx to make sure dose is appropriate  -obtain MRI w/wo contrast - normal  -discussed seizure safety and precautions at previous visit  -f/u in 3-4 weeks, will do labwork at that time   Awake/Alert

## 2025-05-21 LAB
A1C WITH ESTIMATED AVERAGE GLUCOSE RESULT: 8.8 % — HIGH (ref 4–5.6)
ANION GAP SERPL CALC-SCNC: 1 MMOL/L — LOW (ref 5–17)
BUN SERPL-MCNC: 15 MG/DL — SIGNIFICANT CHANGE UP (ref 7–23)
CALCIUM SERPL-MCNC: 8.5 MG/DL — SIGNIFICANT CHANGE UP (ref 8.5–10.1)
CHLORIDE SERPL-SCNC: 110 MMOL/L — HIGH (ref 96–108)
CO2 SERPL-SCNC: 29 MMOL/L — SIGNIFICANT CHANGE UP (ref 22–31)
CREAT SERPL-MCNC: 1.22 MG/DL — SIGNIFICANT CHANGE UP (ref 0.5–1.3)
EGFR: 60 ML/MIN/1.73M2 — SIGNIFICANT CHANGE UP
EGFR: 60 ML/MIN/1.73M2 — SIGNIFICANT CHANGE UP
ESTIMATED AVERAGE GLUCOSE: 206 MG/DL — HIGH (ref 68–114)
GLUCOSE BLDC GLUCOMTR-MCNC: 162 MG/DL — HIGH (ref 70–99)
GLUCOSE BLDC GLUCOMTR-MCNC: 164 MG/DL — HIGH (ref 70–99)
GLUCOSE BLDC GLUCOMTR-MCNC: 171 MG/DL — HIGH (ref 70–99)
GLUCOSE BLDC GLUCOMTR-MCNC: 206 MG/DL — HIGH (ref 70–99)
GLUCOSE SERPL-MCNC: 169 MG/DL — HIGH (ref 70–99)
MAGNESIUM SERPL-MCNC: 1.7 MG/DL — SIGNIFICANT CHANGE UP (ref 1.6–2.6)
POTASSIUM SERPL-MCNC: 4 MMOL/L — SIGNIFICANT CHANGE UP (ref 3.5–5.3)
POTASSIUM SERPL-SCNC: 4 MMOL/L — SIGNIFICANT CHANGE UP (ref 3.5–5.3)
SODIUM SERPL-SCNC: 140 MMOL/L — SIGNIFICANT CHANGE UP (ref 135–145)
TROPONIN I, HIGH SENSITIVITY RESULT: 148.1 NG/L — HIGH
TROPONIN I, HIGH SENSITIVITY RESULT: 151.1 NG/L — HIGH

## 2025-05-21 PROCEDURE — 99232 SBSQ HOSP IP/OBS MODERATE 35: CPT

## 2025-05-21 PROCEDURE — 99223 1ST HOSP IP/OBS HIGH 75: CPT | Mod: GC

## 2025-05-21 RX ADMIN — FINASTERIDE 5 MILLIGRAM(S): 1 TABLET, FILM COATED ORAL at 11:31

## 2025-05-21 RX ADMIN — Medication 81 MILLIGRAM(S): at 11:32

## 2025-05-21 RX ADMIN — METOPROLOL SUCCINATE 25 MILLIGRAM(S): 50 TABLET, EXTENDED RELEASE ORAL at 17:41

## 2025-05-21 RX ADMIN — ISOSORBIDE MONONITRATE 30 MILLIGRAM(S): 60 TABLET, EXTENDED RELEASE ORAL at 11:31

## 2025-05-21 RX ADMIN — ATORVASTATIN CALCIUM 10 MILLIGRAM(S): 80 TABLET, FILM COATED ORAL at 22:16

## 2025-05-21 RX ADMIN — TAMSULOSIN HYDROCHLORIDE 0.4 MILLIGRAM(S): 0.4 CAPSULE ORAL at 22:11

## 2025-05-21 RX ADMIN — INSULIN LISPRO 1: 100 INJECTION, SOLUTION INTRAVENOUS; SUBCUTANEOUS at 17:41

## 2025-05-21 RX ADMIN — FUROSEMIDE 40 MILLIGRAM(S): 10 INJECTION INTRAMUSCULAR; INTRAVENOUS at 05:24

## 2025-05-21 RX ADMIN — CLOPIDOGREL BISULFATE 75 MILLIGRAM(S): 75 TABLET, FILM COATED ORAL at 11:31

## 2025-05-21 RX ADMIN — INSULIN LISPRO 1: 100 INJECTION, SOLUTION INTRAVENOUS; SUBCUTANEOUS at 08:05

## 2025-05-21 RX ADMIN — CEFTRIAXONE 100 MILLIGRAM(S): 500 INJECTION, POWDER, FOR SOLUTION INTRAMUSCULAR; INTRAVENOUS at 17:41

## 2025-05-21 RX ADMIN — INSULIN GLARGINE-YFGN 26 UNIT(S): 100 INJECTION, SOLUTION SUBCUTANEOUS at 22:11

## 2025-05-21 RX ADMIN — METOPROLOL SUCCINATE 25 MILLIGRAM(S): 50 TABLET, EXTENDED RELEASE ORAL at 05:24

## 2025-05-21 RX ADMIN — INSULIN LISPRO 1: 100 INJECTION, SOLUTION INTRAVENOUS; SUBCUTANEOUS at 11:45

## 2025-05-21 NOTE — PATIENT PROFILE ADULT - DO YOU NEED ADDITIONAL SERVICES TO MANAGE ANY OF THESE MEDICAL CONDITIONS AT HOME?
no Purse String (Intermediate) Text: Given the location of the defect and the characteristics of the surrounding skin a pursestring intermediate closure was deemed most appropriate.  Undermining was performed circumfirentially around the surgical defect.  A purstring suture was then placed and tightened.

## 2025-05-21 NOTE — PROGRESS NOTE ADULT - ASSESSMENT
Neel Barrett is an 79 year old male with PMHx of HTN, HLD, CAD (s/p PCI), s/p PPM placement, IDDM2, and BPH who presented to the ED on 5/20/25 for complaints of passing out and admitted for syncope, need to r/o new onset CHF.    Syncope, need to r/o new onset CHF  Son reports patient fell down two steps on staircase, landed on his back, and passed out, presumed LOC as patient does not recall episode  No tongue biting, urinary or bowel incontinence, no prior episodes in the past  Associated shortness of breath x 4 days, along with orthopnea, paroxysmal nocturnal dyspnea, bendopnea, and leg swelling, admits to eating high sodium diet  EKG without signs of ischemia  Troponin 124.3 then 135.1, pro-, COVID/influenza/RSV negative  CT head without acute intracranial hemorrhage but with prominent b/l frontal parietal extra-axial spaces 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 volume loss  CT C-spine without acute fracture or traumatic subluxation but with multi-level degenerative changes  CXR without acute finding  Empirically started on furosemide 40 mg IV daily for now, strict Is and Os, daily weights, low salt diet, 2L fluid restriction  Echo with preserved EF  PPM interrogation    Monitor renal function and electrolytes while on IV diuresis  Telemetry    Concern for UTI  Reports dysuria, urinary frequency, and malodorous urine x 2 weeks  Afebrile, no leukocytosis  U/A with proteinuria, trace blood, negative leuks, negative nitrites, WBC 2, RBC 15, glucosuria  Ceftriaxone 1 g IV started despite WBC 2 on U/A given symptomatic  F/u urine culture      Chronic medical conditions:  HTN: PTA metoprolol tartrate 25 mg BID  HLD: PTA atorvastatin 10 mg  CAD (s/p PCI): PTA ASA 81 mg, clopidogrel 75 mg, isosorbide mononitrate 30 mg  IDDM2 with hyperglycemia: last known A1c 7.6 (on 9/21/18), blood glucose 217 on admission, POC qac and qhs, PTA Lantus 26 U qhs, PTA metformin 1000 mg BID and Trulicity 1.5 mg weekly held, low dose SSI qac started, blood glucose goal < 180, f/u A1c  BPH: PTA tamsulosin 0.4 mg, finasteride 5 mg  Normocytic anemia, chronic?: hgb 12.5 on admission, unknown baseline but hgb 12.7 (on 2/5/21), no signs of active bleeding  Thrombocytopenia, chronic?: plts 142K on admission, unknown baseline plts but plts 122K (on 2/5/21), no signs of active bleeding    Medication reconciliation completed using med list provided by son at bedside.    Plan of care discussed with son at bedside.

## 2025-05-21 NOTE — PATIENT PROFILE ADULT - FUNCTIONAL SCREEN CURRENT LEVEL: COMMUNICATION, MLM
Ross Iqbal presents today for   Chief Complaint   Patient presents with    Dizziness     Patient is experiencing dizziness, unbalanced, severe headache behind right ear. Started a week ago. Is someone accompanying this pt? yes    Is the patient using any DME equipment during [address]? no    Depression Screening:  3 most recent PHQ Screens 12/27/2022   Little interest or pleasure in doing things Not at all   Feeling down, depressed, irritable, or hopeless Not at all   Total Score PHQ 2 0       Learning Assessment:  No flowsheet data found. Health Maintenance reviewed and discussed and ordered per Provider. Health Maintenance Due   Topic Date Due    Hepatitis C Screening  Never done    COVID-19 Vaccine (1) Never done    DTaP/Tdap/Td series (1 - Tdap) Never done    Lipid Screen  Never done    Colorectal Cancer Screening Combo  Never done    Flu Vaccine (1) Never done   . Coordination of Care:  1. \"Have you been to the ER, urgent care clinic since your last visit? Hospitalized since your last visit? \" No    2. \"Have you seen or consulted any other health care providers outside of the [address] [place] [address] since your last visit? \" No     3. For patients aged 39-70: Has the patient had a colonoscopy? No     If the patient is female:    4. For patients aged 41-77: Has the patient had a mammogram within the past 2 years? No    5. For patients aged 21-65: Has the patient had a pap smear?  No 0 = understands/communicates without difficulty

## 2025-05-21 NOTE — CONSULT NOTE ADULT - ASSESSMENT
79 F HTN HLD DM CAD/PCI  with syncope escalating MARTINEZ  5/20/25 TTE LVEF 58% LAE mild, MR mild AR mild chronic RBBB  BS PM 2021  Meds ASA Plavix, metoprolol 25 BID imdur 30/d, furosemide 40/d atorvastatin 10/d  Tele unrevealing; PM interrogation pending; hold imdur +/- lasix pending BNP  139/75 HR 72/m K 4.0 Cr 1.22

## 2025-05-21 NOTE — PATIENT PROFILE ADULT - FALL HARM RISK - RISK INTERVENTIONS

## 2025-05-21 NOTE — CONSULT NOTE ADULT - SUBJECTIVE AND OBJECTIVE BOX
History of Present Illness:   Neel Barrett is an 79 year old male with PMHx of HTN, HLD, CAD (s/p PCI), s/p PPM placement, IDDM2, and BPH who presented to the ED on 25 for complaints of passing out.     History primarily obtained from son at bedside as patient does not recall episode. As per son at bedside, patient informed him that he fell last night. So son reviewed camera footage from inside the house. Patient himself thought he fell off his bed but camera caught him falling down two steps on the staircase. Landed on his back. Stayed on the floor until his wife helped him up. Assumed LOC since patient does not recall event. No tongue biting, urinary or bowel incontinence. No prior episodes in the past. In addition, states he has been feeling short of breath for the past four days. Associated orthopnea, paroxysmal nocturnal dyspnea, bendopnea, and leg swelling. Sleeps with 1 pillow. Admits to eating high sodium diet. Also has been having dysuria, urinary frequency, and malodorous urine for the past two weeks. 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 Lives at home with wife and daughter.    In the ED, VSS except BP as elevated as 173/89. Hgb 12.5, plts 142K, blood glucose 217. Troponin 124.3 then 135.1. Pro-. U/A with proteinuria, trace blood, negative leuks, negative nitrites, WBC 2, RBC 15, glucosuria. COVID/influenza/RSV negative. CT head without acute intracranial hemorrhage but with prominent bilateral frontal parietal extra-axial spaces 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 volume loss. CT C-spine without acute fracture or traumatic subluxation but with multi-level degenerative changes. CXR without acute finding. Did not receive any medications.  Home Medications:   * Patient Currently Takes Medications as of 2021 00:43 documented in Structured Notes  · 	metoprolol tartrate 25 mg oral tablet: 1 tab(s) orally 2 times a day  · 	aspirin 81 mg oral tablet: Last Dose Taken:  , 1 tab(s) orally once a day  · 	isosorbide mononitrate 30 mg oral tablet, extended release: Last Dose Taken:  , 1 tab(s) orally once a day  · 	finasteride 5 mg oral tablet: 1 tab(s) orally once a day  · 	metFORMIN 1000 mg oral tablet: 1 tab(s) orally 2 times a day  · 	atorvastatin 10 mg oral tablet: 1 tab(s) orally once a day  · 	Flomax 0.4 mg oral capsule: 1 cap(s) orally once a day  · 	Plavix 75 mg oral tablet: 1 tab(s) orally once a day  · 	Lantus 100 units/mL subcutaneous solution: Last Dose Taken:  , 26 unit(s) subcutaneous once a day (at bedtime)  · 	Trulicity Pen 1.5 mg/0.5 mL subcutaneous solution: Last Dose Taken:  , 1.5 milligram(s) subcutaneously once a week on Patient History:   Past Medical, Past Surgical, and Family History:  PAST MEDICAL HISTORY:  BPH (benign prostatic hyperplasia)     CAD S/P percutaneous coronary angioplasty     HLD (hyperlipidemia)     HTN (Hypertension)     Insulin dependent type 2 diabetes mellitus.     PAST SURGICAL HISTORY:  S/P coronary artery stent placement. Physical Exam:   Physical Exam: T(C): 36.7 (25 @ 15:20), Max: 36.7 (25 @ 15:20)  HR: 100 (25 @ 18:14) (82 - 100)  BP: 159/98 (25 @ 18:14) (159/98 - 173/89)  RR: 20 (25 @ 18:14) (18 - 20)  SpO2: 99% (25 @ 18:14) (98% - 99%)    CONSTITUTIONAL: Well groomed, no apparent distress, pleasant, conversational  EYES: L. eye blindness  ENMT: Oral mucosa with moist membranes  RESP: No respiratory distress, no use of accessory muscles; diminished breath sounds b/l  CV: RRR  GI: Soft, NT, ND  SKIN: b/l LE 2+ pitting edema  Labs and Results:  Labs, Radiology, Cardiology, and Other Results: 12.5   6.00  )-----------( 142      ( 20 May 2025 10:21 )             39.6     137  |  112[H]  |  15  ----------------------------<  217[H]     05-20  4.6   |  25  |  1.27    Ca    8.6      20 May 2025 10:21  Mg     1.7         TPro  6.7  /  Alb  2.9[L]  /  TBili  0.4  /  DBili  x   /  AST  39[H]  /  ALT  34  /  AlkPhos  96      PT/INR: 10.8/0.96 (25 @ 10:21)  PTT: 21.5 (25 @ 10:21)    Pro-BNP: 381 (25 @ 10:21)    Urinalysis Basic - ( 20 May 2025 10:51 )  Color: Yellow / Appearance: Clear / S.026 / pH: 8.0  Gluc: 500 mg/dL / Ketone: x  / Bili: Negative / Urobili: 1.0 mg/dL   Blood: Trace / Protein: >=1000 mg/dL / Nitrite: Negative   Leuk Esterase: Negative / RBC: 15 /HPF / WBC 2 /HPF   Sq Epi: x / Bacteria: x  Hyaline Casts: x/WBC Casts: x    Urinalysis with Rflx Culture (collected 20 May 2025 10:51)    CT head without contrast 25  IMPRESSION:  1.  No acute intracranial hemorrhage or midline shift.  2.  Prominent bilateral frontal parietal extra-axial spaces 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 volume loss.    CT C-spine without contrast 25  IMPRESSION:  No acute fracture or traumatic subluxation.    Multi-level degenerative changes.    Chest x-ray 25  IMPRESSION:  Left-sided defibrillator new since 2018. No acute finding.  < from: TTE Echo Complete w/o Contrast w/ Doppler (25 @ 20:04) >  CONCLUSIONS:     1. Left ventricular cavity is normal in size. Left ventricular systolic   function is normal with an ejection fraction of 57 % by Willingham's method   of disks.   2. Mild mitral regurgitation.   3. Mild aortic regurgitation.   4. Left atrium is mildly dilated.   5. No pericardial effusion seen.    < end of copied text >

## 2025-05-21 NOTE — PROGRESS NOTE ADULT - SUBJECTIVE AND OBJECTIVE BOX
Patient is a 79y old  Male who presents with a chief complaint of Syncope, need to r/o new onset CHF (21 May 2025 16:38)      INTERVAL HPI/OVERNIGHT EVENTS:  Pt was seen and examined, no acute events.        MEDICATIONS  (STANDING):  aspirin  chewable 81 milliGRAM(s) Oral daily  atorvastatin 10 milliGRAM(s) Oral at bedtime  cefTRIAXone   IVPB 1000 milliGRAM(s) IV Intermittent every 24 hours  clopidogrel Tablet 75 milliGRAM(s) Oral daily  dextrose 5%. 1000 milliLiter(s) (100 mL/Hr) IV Continuous <Continuous>  dextrose 5%. 1000 milliLiter(s) (50 mL/Hr) IV Continuous <Continuous>  dextrose 50% Injectable 25 Gram(s) IV Push once  dextrose 50% Injectable 12.5 Gram(s) IV Push once  dextrose 50% Injectable 25 Gram(s) IV Push once  finasteride 5 milliGRAM(s) Oral daily  furosemide   Injectable 40 milliGRAM(s) IV Push daily  glucagon  Injectable 1 milliGRAM(s) IntraMuscular once  insulin glargine Injectable (LANTUS) 26 Unit(s) SubCutaneous at bedtime  insulin lispro (ADMELOG) corrective regimen sliding scale   SubCutaneous three times a day before meals  isosorbide   mononitrate ER Tablet (IMDUR) 30 milliGRAM(s) Oral daily  metoprolol tartrate 25 milliGRAM(s) Oral two times a day  tamsulosin 0.4 milliGRAM(s) Oral at bedtime    MEDICATIONS  (PRN):  acetaminophen     Tablet .. 650 milliGRAM(s) Oral every 6 hours PRN Temp greater or equal to 38C (100.4F), Mild Pain (1 - 3)  aluminum hydroxide/magnesium hydroxide/simethicone Suspension 30 milliLiter(s) Oral every 4 hours PRN Dyspepsia  dextrose Oral Gel 15 Gram(s) Oral once PRN Blood Glucose LESS THAN 70 milliGRAM(s)/deciliter  melatonin 3 milliGRAM(s) Oral at bedtime PRN Insomnia  ondansetron Injectable 4 milliGRAM(s) IV Push every 8 hours PRN Nausea and/or Vomiting      Allergies    No Known Allergies    Intolerances          Vital Signs Last 24 Hrs  T(C): 36.4 (21 May 2025 16:47), Max: 37 (20 May 2025 23:35)  T(F): 97.6 (21 May 2025 16:47), Max: 98.6 (20 May 2025 23:35)  HR: 82 (21 May 2025 16:47) (72 - 105)  BP: 101/62 (21 May 2025 16:47) (101/62 - 158/87)  BP(mean): 75 (21 May 2025 16:47) (75 - 75)  RR: 18 (21 May 2025 16:47) (18 - 19)  SpO2: 99% (21 May 2025 16:47) (97% - 100%)    Parameters below as of 21 May 2025 16:47  Patient On (Oxygen Delivery Method): room air        PHYSICAL EXAM:  GENERAL: NAD  HEAD:  Atraumatic  EYES: PERRLA  ENMT: Mouth moist  NECK: Supple  NERVOUS SYSTEM:  Awake, alert  CHEST/LUNG: Clear  HEART: RRR, S1, S2  ABDOMEN: Soft, non tender  EXTREMITIES:  no edema BL LE  SKIN: No rash        LABS:                        12.5   6.00  )-----------( 142      ( 20 May 2025 10:21 )             39.6     05-21    140  |  110[H]  |  15  ----------------------------<  169[H]  4.0   |  29  |  1.22    Ca    8.5      21 May 2025 07:30  Mg     1.7     05-21    TPro  6.7  /  Alb  2.9[L]  /  TBili  0.4  /  DBili  x   /  AST  39[H]  /  ALT  34  /  AlkPhos  96  05-20    PT/INR - ( 20 May 2025 10:21 )   PT: 10.8 sec;   INR: 0.96 ratio         PTT - ( 20 May 2025 10:21 )  PTT:21.5 sec  Urinalysis Basic - ( 21 May 2025 07:30 )    Color: x / Appearance: x / SG: x / pH: x  Gluc: 169 mg/dL / Ketone: x  / Bili: x / Urobili: x   Blood: x / Protein: x / Nitrite: x   Leuk Esterase: x / RBC: x / WBC x   Sq Epi: x / Non Sq Epi: x / Bacteria: x      CAPILLARY BLOOD GLUCOSE      POCT Blood Glucose.: 162 mg/dL (21 May 2025 17:00)  POCT Blood Glucose.: 164 mg/dL (21 May 2025 11:41)  POCT Blood Glucose.: 171 mg/dL (21 May 2025 07:57)      Urinalysis with Rflx Culture (collected 20 May 2025 10:51)      RADIOLOGY & ADDITIONAL TESTS:    Imaging Personally Reviewed:  [ ] YES  [ ] NO    Consultant(s) Notes Reviewed:  [ ] YES  [ ] NO    Care Discussed with Consultants/Other Providers [ ] YES  [ ] N

## 2025-05-22 LAB
ANION GAP SERPL CALC-SCNC: 5 MMOL/L — SIGNIFICANT CHANGE UP (ref 5–17)
BUN SERPL-MCNC: 30 MG/DL — HIGH (ref 7–23)
CALCIUM SERPL-MCNC: 8.6 MG/DL — SIGNIFICANT CHANGE UP (ref 8.5–10.1)
CHLORIDE SERPL-SCNC: 106 MMOL/L — SIGNIFICANT CHANGE UP (ref 96–108)
CO2 SERPL-SCNC: 28 MMOL/L — SIGNIFICANT CHANGE UP (ref 22–31)
CREAT SERPL-MCNC: 1.52 MG/DL — HIGH (ref 0.5–1.3)
CULTURE RESULTS: SIGNIFICANT CHANGE UP
EGFR: 46 ML/MIN/1.73M2 — LOW
EGFR: 46 ML/MIN/1.73M2 — LOW
GLUCOSE BLDC GLUCOMTR-MCNC: 176 MG/DL — HIGH (ref 70–99)
GLUCOSE BLDC GLUCOMTR-MCNC: 195 MG/DL — HIGH (ref 70–99)
GLUCOSE BLDC GLUCOMTR-MCNC: 274 MG/DL — HIGH (ref 70–99)
GLUCOSE BLDC GLUCOMTR-MCNC: 305 MG/DL — HIGH (ref 70–99)
GLUCOSE SERPL-MCNC: 173 MG/DL — HIGH (ref 70–99)
MAGNESIUM SERPL-MCNC: 1.7 MG/DL — SIGNIFICANT CHANGE UP (ref 1.6–2.6)
PHOSPHATE SERPL-MCNC: 3.2 MG/DL — SIGNIFICANT CHANGE UP (ref 2.5–4.5)
POTASSIUM SERPL-MCNC: 3.9 MMOL/L — SIGNIFICANT CHANGE UP (ref 3.5–5.3)
POTASSIUM SERPL-SCNC: 3.9 MMOL/L — SIGNIFICANT CHANGE UP (ref 3.5–5.3)
SODIUM SERPL-SCNC: 139 MMOL/L — SIGNIFICANT CHANGE UP (ref 135–145)
SPECIMEN SOURCE: SIGNIFICANT CHANGE UP

## 2025-05-22 PROCEDURE — 99232 SBSQ HOSP IP/OBS MODERATE 35: CPT

## 2025-05-22 PROCEDURE — 99223 1ST HOSP IP/OBS HIGH 75: CPT

## 2025-05-22 RX ORDER — METOPROLOL SUCCINATE 50 MG/1
50 TABLET, EXTENDED RELEASE ORAL
Refills: 0 | Status: DISCONTINUED | OUTPATIENT
Start: 2025-05-22 | End: 2025-05-23

## 2025-05-22 RX ADMIN — FINASTERIDE 5 MILLIGRAM(S): 1 TABLET, FILM COATED ORAL at 11:38

## 2025-05-22 RX ADMIN — INSULIN LISPRO 3: 100 INJECTION, SOLUTION INTRAVENOUS; SUBCUTANEOUS at 11:38

## 2025-05-22 RX ADMIN — INSULIN LISPRO 1: 100 INJECTION, SOLUTION INTRAVENOUS; SUBCUTANEOUS at 08:13

## 2025-05-22 RX ADMIN — METOPROLOL SUCCINATE 25 MILLIGRAM(S): 50 TABLET, EXTENDED RELEASE ORAL at 05:02

## 2025-05-22 RX ADMIN — Medication 81 MILLIGRAM(S): at 11:38

## 2025-05-22 RX ADMIN — FUROSEMIDE 40 MILLIGRAM(S): 10 INJECTION INTRAMUSCULAR; INTRAVENOUS at 05:01

## 2025-05-22 RX ADMIN — TAMSULOSIN HYDROCHLORIDE 0.4 MILLIGRAM(S): 0.4 CAPSULE ORAL at 21:22

## 2025-05-22 RX ADMIN — METOPROLOL SUCCINATE 50 MILLIGRAM(S): 50 TABLET, EXTENDED RELEASE ORAL at 17:22

## 2025-05-22 RX ADMIN — INSULIN GLARGINE-YFGN 26 UNIT(S): 100 INJECTION, SOLUTION SUBCUTANEOUS at 21:23

## 2025-05-22 RX ADMIN — ATORVASTATIN CALCIUM 10 MILLIGRAM(S): 80 TABLET, FILM COATED ORAL at 21:22

## 2025-05-22 RX ADMIN — INSULIN LISPRO 1: 100 INJECTION, SOLUTION INTRAVENOUS; SUBCUTANEOUS at 17:01

## 2025-05-22 RX ADMIN — CLOPIDOGREL BISULFATE 75 MILLIGRAM(S): 75 TABLET, FILM COATED ORAL at 11:38

## 2025-05-22 RX ADMIN — ISOSORBIDE MONONITRATE 30 MILLIGRAM(S): 60 TABLET, EXTENDED RELEASE ORAL at 11:38

## 2025-05-22 NOTE — PROGRESS NOTE ADULT - SUBJECTIVE AND OBJECTIVE BOX
Patient is a 79y old  Male who presents with a chief complaint of Syncope    PAST MEDICAL & SURGICAL HISTORY:  HTN (Hypertension)    CVA    HLD (hyperlipidemia)    CAD S/P percutaneous coronary angioplasty    Insulin dependent type 2 diabetes mellitus    BPH (benign prostatic hyperplasia)    S/P coronary artery stent placement    AICD (Yisel scie)    INTERVAL HISTORY:  	  MEDICATIONS:  MEDICATIONS  (STANDING):  aspirin  chewable 81 milliGRAM(s) Oral daily  atorvastatin 10 milliGRAM(s) Oral at bedtime  cefTRIAXone   IVPB 1000 milliGRAM(s) IV Intermittent every 24 hours  clopidogrel Tablet 75 milliGRAM(s) Oral daily  finasteride 5 milliGRAM(s) Oral daily  furosemide   Injectable 40 milliGRAM(s) IV Push daily  insulin glargine Injectable (LANTUS) 26 Unit(s) SubCutaneous at bedtime  insulin lispro (ADMELOG) corrective regimen sliding scale   SubCutaneous three times a day before meals  isosorbide   mononitrate ER Tablet (IMDUR) 30 milliGRAM(s) Oral daily  metoprolol tartrate 25 milliGRAM(s) Oral two times a day  tamsulosin 0.4 milliGRAM(s) Oral at bedtime    MEDICATIONS  (PRN):  acetaminophen     Tablet .. 650 milliGRAM(s) Oral every 6 hours PRN Temp greater or equal to 38C (100.4F), Mild Pain (1 - 3)  aluminum hydroxide/magnesium hydroxide/simethicone Suspension 30 milliLiter(s) Oral every 4 hours PRN Dyspepsia  dextrose Oral Gel 15 Gram(s) Oral once PRN Blood Glucose LESS THAN 70 milliGRAM(s)/deciliter  melatonin 3 milliGRAM(s) Oral at bedtime PRN Insomnia  ondansetron Injectable 4 milliGRAM(s) IV Push every 8 hours PRN Nausea and/or Vomiting    Vitals:  T(F): 97.7 (05-22-25 @ 04:44), Max: 98.1 (05-21-25 @ 23:27)  HR: 80 (05-22-25 @ 04:44) (72 - 85)  BP: 146/75 (05-22-25 @ 04:44) (101/62 - 148/70)  RR: 18 (05-22-25 @ 04:44) (18 - 18)  SpO2: 100% (05-22-25 @ 04:44) (99% - 100%)    05-21 @ 07:01  -  05-22 @ 07:00  --------------------------------------------------------  IN:  Total IN: 0 mL    OUT:    Voided (mL): 1950 mL  Total OUT: 1950 mL    Total NET: -1950 mL    05-22 @ 07:01  -  05-22 @ 10:13  --------------------------------------------------------  IN:    Oral Fluid: 800 mL  Total IN: 800 mL    OUT:  Total OUT: 0 mL    Total NET: 800 mL    Weight (kg): 86.9 (05-21 @ 00:50)  BMI (kg/m2): 27.5 (05-21 @ 00:50)    PHYSICAL EXAM:  Neuro: Awake, responsive  CV: S1 S2 RRR  Lungs: CTABL  GI: Soft, BS +, ND, NT  Extremities: No edema    TELEMETRY: sinus    RADIOLOGY: < from: Xray Chest 1 View- PORTABLE-Urgent (05.20.25 @ 11:45) >    IMPRESSION: Left-sided defibrillator new since September 2018. No acute   finding.    < end of copied text >    DIAGNOSTIC TESTING:    [x ] Echocardiogram: < from: TTE Echo Complete w/o Contrast w/ Doppler (05.20.25 @ 20:04) >   1. Left ventricular cavity is normal in size. Left ventricular systolic   function is normal with an ejection fraction of 57 % by Willingham's method   of disks.   2. Mild mitral regurgitation.   3. Mild aortic regurgitation.   4. Left atrium is mildly dilated.   5. No pericardial effusion seen.    < end of copied text >    < from: TTE Echo Doppler w/o Cont (09.21.18 @ 12:19) >  Summary:   1. Left ventricular ejection fraction, by visual estimation, is 50 to   55%.   2. Mildly decreased global left ventricular systolic function.   3. Elevated mean left atrial pressure.   4. Normal left ventricular internal cavity size.   5. Spectral Doppler shows impaired relaxation pattern of left   ventricular myocardial filling (Grade I diastolic dysfunction).   6. There is mild concentric left ventricular hypertrophy.   7. Normal right ventricular size and function.   8. The left atrium is normal in size.   9. Normal right atrial size.  10. The right atrium is normal in size.  11. There is no evidence of pericardial effusion.  12. Degenerative mitral valve.  13. Moderate mitral valve regurgitation.  14. Mild-moderate tricuspid regurgitation.  15. Structurally normal tricuspid valve, with normal leaflet excursion.  16. Sclerotic aortic valve with normal opening.  17. Structurally normal pulmonic valve, with normal leaflet excursion.  18. Trace pulmonic valve regurgitation.  19. Pulmonary hypertension is absent.  20. The main pulmonary artery is normal in size.  21. The aortic valve mean gradient is 2.8 mmHg consistent with normally   opening aortic valve.  22. The right atrial pressure is normal.    < end of copied text >    LABS:	 	    CARDIAC MARKERS:  Troponin I, High Sensitivity Result: 151.1 ng/L (05-21 @ 07:30)  Troponin I, High Sensitivity Result: 148.1 ng/L (05-20 @ 22:50)  Troponin I, High Sensitivity Result: 135.1 ng/L (05-20 @ 15:07)  Troponin I, High Sensitivity Result: 124.3 ng/L (05-20 @ 10:21)    21 May 2025 07:30    140    |  110    |  15     ----------------------------<  169    4.0     |  29     |  1.22   20 May 2025 10:21    137    |  112    |  15     ----------------------------<  217    4.6     |  25     |  1.27     Ca    8.5        21 May 2025 07:30  Mg     1.7       21 May 2025 07:30    TPro  6.7    /  Alb  2.9    /  TBili  0.4    /  DBili  x      /  AST  39     /  ALT  34     /  AlkPhos  96     20 May 2025 10:21                       12.5   6.00  )-----------( 142      ( 20 May 2025 10:21 )             39.6   pro-BNP: Pro-Brain Natriuretic Peptide: 381 pg/mL (05.20.25 @ 10:21)    INR: 0.96 ratio (05-20 @ 10:21)           Patient is a 79y old  Male who presents with a chief complaint of Syncope    PAST MEDICAL & SURGICAL HISTORY:  HTN (Hypertension)    CVA    HLD (hyperlipidemia)    CAD S/P percutaneous coronary angioplasty    Insulin dependent type 2 diabetes mellitus    BPH (benign prostatic hyperplasia)    S/P coronary artery stent placement    AICD (Yisel scie)    INTERVAL HISTORY: in no distress  	  MEDICATIONS:  MEDICATIONS  (STANDING):  aspirin  chewable 81 milliGRAM(s) Oral daily  atorvastatin 10 milliGRAM(s) Oral at bedtime  cefTRIAXone   IVPB 1000 milliGRAM(s) IV Intermittent every 24 hours  clopidogrel Tablet 75 milliGRAM(s) Oral daily  finasteride 5 milliGRAM(s) Oral daily  furosemide   Injectable 40 milliGRAM(s) IV Push daily  insulin glargine Injectable (LANTUS) 26 Unit(s) SubCutaneous at bedtime  insulin lispro (ADMELOG) corrective regimen sliding scale   SubCutaneous three times a day before meals  isosorbide   mononitrate ER Tablet (IMDUR) 30 milliGRAM(s) Oral daily  metoprolol tartrate 25 milliGRAM(s) Oral two times a day  tamsulosin 0.4 milliGRAM(s) Oral at bedtime    MEDICATIONS  (PRN):  acetaminophen     Tablet .. 650 milliGRAM(s) Oral every 6 hours PRN Temp greater or equal to 38C (100.4F), Mild Pain (1 - 3)  aluminum hydroxide/magnesium hydroxide/simethicone Suspension 30 milliLiter(s) Oral every 4 hours PRN Dyspepsia  dextrose Oral Gel 15 Gram(s) Oral once PRN Blood Glucose LESS THAN 70 milliGRAM(s)/deciliter  melatonin 3 milliGRAM(s) Oral at bedtime PRN Insomnia  ondansetron Injectable 4 milliGRAM(s) IV Push every 8 hours PRN Nausea and/or Vomiting    Vitals:  T(F): 97.7 (05-22-25 @ 04:44), Max: 98.1 (05-21-25 @ 23:27)  HR: 80 (05-22-25 @ 04:44) (72 - 85)  BP: 146/75 (05-22-25 @ 04:44) (101/62 - 148/70)  RR: 18 (05-22-25 @ 04:44) (18 - 18)  SpO2: 100% (05-22-25 @ 04:44) (99% - 100%)    05-21 @ 07:01  -  05-22 @ 07:00  --------------------------------------------------------  IN:  Total IN: 0 mL    OUT:    Voided (mL): 1950 mL  Total OUT: 1950 mL    Total NET: -1950 mL    05-22 @ 07:01  -  05-22 @ 10:13  --------------------------------------------------------  IN:    Oral Fluid: 800 mL  Total IN: 800 mL    OUT:  Total OUT: 0 mL    Total NET: 800 mL    Weight (kg): 86.9 (05-21 @ 00:50)  BMI (kg/m2): 27.5 (05-21 @ 00:50)    PHYSICAL EXAM:  Neuro: Awake, responsive  CV: S1 S2 RRR  Lungs: diminished to bases   GI: Soft, BS +, ND, NT  Extremities: No edema    TELEMETRY: sinus    RADIOLOGY: < from: Xray Chest 1 View- PORTABLE-Urgent (05.20.25 @ 11:45) >    IMPRESSION: Left-sided defibrillator new since September 2018. No acute   finding.    < end of copied text >    DIAGNOSTIC TESTING:    [x ] Echocardiogram: < from: TTE Echo Complete w/o Contrast w/ Doppler (05.20.25 @ 20:04) >   1. Left ventricular cavity is normal in size. Left ventricular systolic   function is normal with an ejection fraction of 57 % by Willingham's method   of disks.   2. Mild mitral regurgitation.   3. Mild aortic regurgitation.   4. Left atrium is mildly dilated.   5. No pericardial effusion seen.    < end of copied text >    < from: TTE Echo Doppler w/o Cont (09.21.18 @ 12:19) >  Summary:   1. Left ventricular ejection fraction, by visual estimation, is 50 to   55%.   2. Mildly decreased global left ventricular systolic function.   3. Elevated mean left atrial pressure.   4. Normal left ventricular internal cavity size.   5. Spectral Doppler shows impaired relaxation pattern of left   ventricular myocardial filling (Grade I diastolic dysfunction).   6. There is mild concentric left ventricular hypertrophy.   7. Normal right ventricular size and function.   8. The left atrium is normal in size.   9. Normal right atrial size.  10. The right atrium is normal in size.  11. There is no evidence of pericardial effusion.  12. Degenerative mitral valve.  13. Moderate mitral valve regurgitation.  14. Mild-moderate tricuspid regurgitation.  15. Structurally normal tricuspid valve, with normal leaflet excursion.  16. Sclerotic aortic valve with normal opening.  17. Structurally normal pulmonic valve, with normal leaflet excursion.  18. Trace pulmonic valve regurgitation.  19. Pulmonary hypertension is absent.  20. The main pulmonary artery is normal in size.  21. The aortic valve mean gradient is 2.8 mmHg consistent with normally   opening aortic valve.  22. The right atrial pressure is normal.    < end of copied text >    LABS:	 	    CARDIAC MARKERS:  Troponin I, High Sensitivity Result: 151.1 ng/L (05-21 @ 07:30)  Troponin I, High Sensitivity Result: 148.1 ng/L (05-20 @ 22:50)  Troponin I, High Sensitivity Result: 135.1 ng/L (05-20 @ 15:07)  Troponin I, High Sensitivity Result: 124.3 ng/L (05-20 @ 10:21)    21 May 2025 07:30    140    |  110    |  15     ----------------------------<  169    4.0     |  29     |  1.22   20 May 2025 10:21    137    |  112    |  15     ----------------------------<  217    4.6     |  25     |  1.27     Ca    8.5        21 May 2025 07:30  Mg     1.7       21 May 2025 07:30    TPro  6.7    /  Alb  2.9    /  TBili  0.4    /  DBili  x      /  AST  39     /  ALT  34     /  AlkPhos  96     20 May 2025 10:21                       12.5   6.00  )-----------( 142      ( 20 May 2025 10:21 )             39.6   pro-BNP: Pro-Brain Natriuretic Peptide: 381 pg/mL (05.20.25 @ 10:21)    INR: 0.96 ratio (05-20 @ 10:21)

## 2025-05-22 NOTE — PHYSICAL THERAPY INITIAL EVALUATION ADULT - ADDITIONAL COMMENTS
As per patient, he lives c wife, daughter and granddaughter in a private house with 3 steps to enter, no rail and 1 flight once inside, +rail. Independent c all ADL's and ambulation with walking stick.

## 2025-05-22 NOTE — PROGRESS NOTE ADULT - ASSESSMENT
79 F HTN HLD DM CAD/PCI AICD with syncope escalating MARTINEZ  5/20/25 TTE LVEF 58% LAE mild, MR mild AR mild chronic RBBB  BS ICD 2021    Device interrogated, no dysfunction, no pathologic arrythmia correlating with syncope   17 beats NSVT noted on tele around 3 am  increase metoprolol to 50 bid   ?need for DAPT, no recent PCI  cont on Lasix 40 iv for now  keep k >4, Mg >2, phos >3  cont atorvastatin 10/d  hold Imdur   no orthostasis  increase activity as tolerated

## 2025-05-22 NOTE — PHYSICAL THERAPY INITIAL EVALUATION ADULT - GAIT TRAINING, PT EVAL
Pt will improve ambulation to ~300 feet Independently using walking stick within 2 weeks. Pt will negotiate ~12 steps c walking stick Independently within 2 weeks.

## 2025-05-22 NOTE — PHYSICAL THERAPY INITIAL EVALUATION ADULT - PERTINENT HX OF CURRENT PROBLEM, REHAB EVAL
79 year old male with PMHx of HTN, HLD, CAD (s/p PCI), s/p PPM placement, IDDM2, and BPH who presented to the ED on 5/20/25 for complaints of passing out.     History primarily obtained from son at bedside as patient does not recall episode. As per son at bedside, patient informed him that he fell last night. So son reviewed camera footage from inside the house. Patient himself thought he fell off his bed but camera caught him falling down two steps on the staircase. Landed on his back. Stayed on the floor until his wife helped him up. Assumed LOC since patient does not recall event. No tongue biting, urinary or bowel incontinence. No prior episodes in the past. In addition, states he has been feeling short of breath for the past four days. Associated orthopnea, paroxysmal nocturnal dyspnea, bendopnea, and leg swelling. Sleeps with 1 pillow. Admits to eating high sodium diet. Also has been having dysuria, urinary frequency, and malodorous urine for the past two weeks. 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 Lives at home with wife and daughter.

## 2025-05-22 NOTE — PHYSICAL THERAPY INITIAL EVALUATION ADULT - GENERAL OBSERVATIONS, REHAB EVAL
Chart (EMR) reviewed. Received supine c HOB elevated, NAD. +heplock, +Left eye blindness. Alert. Ox4. Able to follow multistep commands/directions.

## 2025-05-22 NOTE — PROGRESS NOTE ADULT - SUBJECTIVE AND OBJECTIVE BOX
Patient is a 79y old  Male who presents with a chief complaint of Syncope, need to r/o new onset CHF (22 May 2025 10:13)      INTERVAL HPI/OVERNIGHT EVENTS:  Pt was seen and examined,  no acute events.      MEDICATIONS  (STANDING):  aspirin  chewable 81 milliGRAM(s) Oral daily  atorvastatin 10 milliGRAM(s) Oral at bedtime  clopidogrel Tablet 75 milliGRAM(s) Oral daily  dextrose 5%. 1000 milliLiter(s) (100 mL/Hr) IV Continuous <Continuous>  dextrose 5%. 1000 milliLiter(s) (50 mL/Hr) IV Continuous <Continuous>  dextrose 50% Injectable 25 Gram(s) IV Push once  dextrose 50% Injectable 12.5 Gram(s) IV Push once  dextrose 50% Injectable 25 Gram(s) IV Push once  finasteride 5 milliGRAM(s) Oral daily  furosemide   Injectable 40 milliGRAM(s) IV Push daily  glucagon  Injectable 1 milliGRAM(s) IntraMuscular once  insulin glargine Injectable (LANTUS) 26 Unit(s) SubCutaneous at bedtime  insulin lispro (ADMELOG) corrective regimen sliding scale   SubCutaneous three times a day before meals  isosorbide   mononitrate ER Tablet (IMDUR) 30 milliGRAM(s) Oral daily  metoprolol tartrate 25 milliGRAM(s) Oral two times a day  tamsulosin 0.4 milliGRAM(s) Oral at bedtime    MEDICATIONS  (PRN):  acetaminophen     Tablet .. 650 milliGRAM(s) Oral every 6 hours PRN Temp greater or equal to 38C (100.4F), Mild Pain (1 - 3)  aluminum hydroxide/magnesium hydroxide/simethicone Suspension 30 milliLiter(s) Oral every 4 hours PRN Dyspepsia  dextrose Oral Gel 15 Gram(s) Oral once PRN Blood Glucose LESS THAN 70 milliGRAM(s)/deciliter  melatonin 3 milliGRAM(s) Oral at bedtime PRN Insomnia  ondansetron Injectable 4 milliGRAM(s) IV Push every 8 hours PRN Nausea and/or Vomiting      Allergies  No Known Allergies      Vital Signs Last 24 Hrs  T(C): 36.9 (22 May 2025 15:57), Max: 36.9 (22 May 2025 15:57)  T(F): 98.5 (22 May 2025 15:57), Max: 98.5 (22 May 2025 15:57)  HR: 83 (22 May 2025 15:57) (76 - 85)  BP: 120/69 (22 May 2025 15:57) (101/62 - 148/70)  BP(mean): 75 (21 May 2025 16:47) (75 - 75)  RR: 18 (22 May 2025 15:57) (18 - 18)  SpO2: 98% (22 May 2025 15:57) (98% - 100%)    Parameters below as of 21 May 2025 16:47  Patient On (Oxygen Delivery Method): room air        PHYSICAL EXAM:  GENERAL: NAD  HEAD:  Atraumatic  EYES: PERRLA  ENMT: Mouth moist  NECK: Supple  NERVOUS SYSTEM:  Awake, alert  CHEST/LUNG: Clear  HEART: RRR, S1, S2  ABDOMEN: Soft, non tender  EXTREMITIES:  no edema BL LE  SKIN: No rash      LABS:    05-21    140  |  110[H]  |  15  ----------------------------<  169[H]  4.0   |  29  |  1.22    Ca    8.5      21 May 2025 07:30  Mg     1.7     05-21        Urinalysis Basic - ( 21 May 2025 07:30 )    Color: x / Appearance: x / SG: x / pH: x  Gluc: 169 mg/dL / Ketone: x  / Bili: x / Urobili: x   Blood: x / Protein: x / Nitrite: x   Leuk Esterase: x / RBC: x / WBC x   Sq Epi: x / Non Sq Epi: x / Bacteria: x      CAPILLARY BLOOD GLUCOSE      POCT Blood Glucose.: 274 mg/dL (22 May 2025 10:51)  POCT Blood Glucose.: 176 mg/dL (22 May 2025 07:51)  POCT Blood Glucose.: 206 mg/dL (21 May 2025 21:44)  POCT Blood Glucose.: 162 mg/dL (21 May 2025 17:00)      Culture - Urine (collected 21 May 2025 07:00)  Source: Clean Catch Clean Catch (Midstream)  Final Report (22 May 2025 12:24):    <10,000 CFU/mL Normal Urogenital Ellyn    Urinalysis with Rflx Culture (collected 20 May 2025 10:51)      RADIOLOGY & ADDITIONAL TESTS:    Imaging Personally Reviewed:  [ ] YES  [ ] NO    Consultant(s) Notes Reviewed:  [ ] YES  [ ] NO    Care Discussed with Consultants/Other Providers [ ] YES  [ ] NO

## 2025-05-22 NOTE — PROGRESS NOTE ADULT - ASSESSMENT
Neel Barrett is an 79 year old male with PMHx of HTN, HLD, CAD (s/p PCI), s/p PPM placement, IDDM2, and BPH who presented to the ED on 5/20/25 for complaints of passing out and admitted for syncope, need to r/o new onset CHF.    Syncope:  CHF ruled out   EKG without signs of ischemia  CT head without acute intracranial hemorrhage but with prominent b/l frontal parietal extra-axial spaces 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 volume loss  CT C-spine without acute fracture or traumatic subluxation but with multi-level degenerative changes  CXR without acute finding  Echo with preserved EF  PPM interrogation  On IV diuresis  Telemetry      UTI ruled out  Clx neg, dc abx      Chronic medical conditions:  HTN: PTA metoprolol tartrate 25 mg BID  HLD: PTA atorvastatin 10 mg  CAD (s/p PCI): PTA ASA 81 mg, clopidogrel 75 mg, isosorbide mononitrate 30 mg  IDDM2 with hyperglycemia: last known A1c 7.6 (on 9/21/18), blood glucose 217 on admission, POC qac and qhs, PTA Lantus 26 U qhs, PTA metformin 1000 mg BID and Trulicity 1.5 mg weekly held, low dose SSI qac started, blood glucose goal < 180, f/u A1c  BPH: PTA tamsulosin 0.4 mg, finasteride 5 mg  Normocytic anemia, chronic?: hgb 12.5 on admission, unknown baseline but hgb 12.7 (on 2/5/21), no signs of active bleeding  Thrombocytopenia, chronic?: plts 142K on admission, unknown baseline plts but plts 122K (on 2/5/21), no signs of active bleeding

## 2025-05-23 ENCOUNTER — TRANSCRIPTION ENCOUNTER (OUTPATIENT)
Age: 80
End: 2025-05-23

## 2025-05-23 VITALS
OXYGEN SATURATION: 98 % | SYSTOLIC BLOOD PRESSURE: 138 MMHG | HEART RATE: 72 BPM | DIASTOLIC BLOOD PRESSURE: 76 MMHG | RESPIRATION RATE: 18 BRPM | TEMPERATURE: 98 F

## 2025-05-23 LAB
ANION GAP SERPL CALC-SCNC: 3 MMOL/L — LOW (ref 5–17)
BUN SERPL-MCNC: 27 MG/DL — HIGH (ref 7–23)
CALCIUM SERPL-MCNC: 8.7 MG/DL — SIGNIFICANT CHANGE UP (ref 8.5–10.1)
CHLORIDE SERPL-SCNC: 107 MMOL/L — SIGNIFICANT CHANGE UP (ref 96–108)
CO2 SERPL-SCNC: 29 MMOL/L — SIGNIFICANT CHANGE UP (ref 22–31)
CREAT SERPL-MCNC: 1.32 MG/DL — HIGH (ref 0.5–1.3)
EGFR: 55 ML/MIN/1.73M2 — LOW
EGFR: 55 ML/MIN/1.73M2 — LOW
GLUCOSE BLDC GLUCOMTR-MCNC: 152 MG/DL — HIGH (ref 70–99)
GLUCOSE BLDC GLUCOMTR-MCNC: 236 MG/DL — HIGH (ref 70–99)
GLUCOSE BLDC GLUCOMTR-MCNC: 97 MG/DL — SIGNIFICANT CHANGE UP (ref 70–99)
GLUCOSE SERPL-MCNC: 159 MG/DL — HIGH (ref 70–99)
MAGNESIUM SERPL-MCNC: 1.8 MG/DL — SIGNIFICANT CHANGE UP (ref 1.6–2.6)
POTASSIUM SERPL-MCNC: 3.7 MMOL/L — SIGNIFICANT CHANGE UP (ref 3.5–5.3)
POTASSIUM SERPL-SCNC: 3.7 MMOL/L — SIGNIFICANT CHANGE UP (ref 3.5–5.3)
SODIUM SERPL-SCNC: 139 MMOL/L — SIGNIFICANT CHANGE UP (ref 135–145)
TSH SERPL-MCNC: 2.66 UU/ML — SIGNIFICANT CHANGE UP (ref 0.36–3.74)

## 2025-05-23 PROCEDURE — 99239 HOSP IP/OBS DSCHRG MGMT >30: CPT

## 2025-05-23 RX ORDER — METOPROLOL SUCCINATE 50 MG/1
1 TABLET, EXTENDED RELEASE ORAL
Qty: 60 | Refills: 0
Start: 2025-05-23 | End: 2025-06-21

## 2025-05-23 RX ORDER — ISOSORBIDE MONONITRATE 60 MG/1
1 TABLET, EXTENDED RELEASE ORAL
Refills: 0 | DISCHARGE

## 2025-05-23 RX ORDER — MAGNESIUM SULFATE 500 MG/ML
1 SYRINGE (ML) INJECTION ONCE
Refills: 0 | Status: COMPLETED | OUTPATIENT
Start: 2025-05-23 | End: 2025-05-23

## 2025-05-23 RX ADMIN — Medication 81 MILLIGRAM(S): at 11:47

## 2025-05-23 RX ADMIN — FINASTERIDE 5 MILLIGRAM(S): 1 TABLET, FILM COATED ORAL at 11:47

## 2025-05-23 RX ADMIN — METOPROLOL SUCCINATE 50 MILLIGRAM(S): 50 TABLET, EXTENDED RELEASE ORAL at 05:56

## 2025-05-23 RX ADMIN — INSULIN LISPRO 2: 100 INJECTION, SOLUTION INTRAVENOUS; SUBCUTANEOUS at 11:48

## 2025-05-23 RX ADMIN — INSULIN LISPRO 1: 100 INJECTION, SOLUTION INTRAVENOUS; SUBCUTANEOUS at 07:59

## 2025-05-23 RX ADMIN — Medication 40 MILLIEQUIVALENT(S): at 13:52

## 2025-05-23 RX ADMIN — Medication 100 GRAM(S): at 13:52

## 2025-05-23 RX ADMIN — METOPROLOL SUCCINATE 50 MILLIGRAM(S): 50 TABLET, EXTENDED RELEASE ORAL at 17:35

## 2025-05-23 RX ADMIN — CLOPIDOGREL BISULFATE 75 MILLIGRAM(S): 75 TABLET, FILM COATED ORAL at 11:47

## 2025-05-23 RX ADMIN — FUROSEMIDE 40 MILLIGRAM(S): 10 INJECTION INTRAMUSCULAR; INTRAVENOUS at 05:56

## 2025-05-23 NOTE — DISCHARGE NOTE PROVIDER - HOSPITAL COURSE
Neel Barrtet is an 79 year old male with PMHx of HTN, HLD, CAD (s/p PCI), s/p PPM placement, IDDM2, and BPH who presented to the ED on 5/20/25 for complaints of passing out and admitted for syncope    Syncope:  CHF ruled out   EKG without signs of ischemia  Unclear etiology of syncope, per son never passed out although pt doesn't recall   CT head without acute intracranial hemorrhage but with prominent b/l frontal parietal extra-axial spaces 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 volume loss  CT C-spine without acute fracture or traumatic subluxation but with multi-level degenerative changes.  CXR without acute finding  Echo with preserved EF  AICD interrogated, NSVT in tele. AICD setting adjusted , BB dose increased     UTI ruled out  Clx neg, dc abx    Chronic medical conditions:  HTN: PTA metoprolol tartrate 25 mg BID  HLD: PTA atorvastatin 10 mg  CAD (s/p PCI): PTA ASA 81 mg, Not on Plavix, no indication currently, ( PCI 4 yrs ago and TIA yrs ago) isosorbide mononitrate 30 mg  IDDM2 with hyperglycemia:A1c 8.8, resume home med  BPH: PTA tamsulosin 0.4 mg, finasteride 5 mg  Normocytic anemia, chronic?: hgb stable, no signs of active bleeding  Thrombocytopenia, chronic?: plts stable       Neel Barrett is an 79 year old male with PMHx of HTN, HLD, CAD (s/p PCI), s/p PPM placement, IDDM2, and BPH who presented to the ED on 5/20/25 for complaints of passing out and admitted for syncope    Syncope:  CHF ruled out   EKG without signs of ischemia  Unclear etiology of syncope, per son never passed out although pt doesn't recall   CT head without acute intracranial hemorrhage but with prominent b/l frontal parietal extra-axial spaces 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 volume loss  CT C-spine without acute fracture or traumatic subluxation but with multi-level degenerative changes.  CXR without acute finding  Echo with preserved EF  AICD interrogated, NSVT in tele. AICD setting adjusted , BB dose increased     UTI ruled out  Clx neg, dc abx    Chronic medical conditions:  HTN: PTA metoprolol tartrate 25 mg BID, increased to 50 mg BID   HLD: PTA atorvastatin 10 mg  CAD (s/p PCI): PTA ASA 81 mg, Not on Plavix, no indication currently, ( PCI 4 yrs ago and TIA yrs ago) isosorbide mononitrate 30 mg  IDDM2 with hyperglycemia:A1c 8.8, resume home med  BPH: PTA tamsulosin 0.4 mg, finasteride 5 mg  Normocytic anemia, chronic?: hgb stable, no signs of active bleeding  Thrombocytopenia, chronic?: plts stable

## 2025-05-23 NOTE — DISCHARGE NOTE PROVIDER - NSDCCPCAREPLAN_GEN_ALL_CORE_FT
PRINCIPAL DISCHARGE DIAGNOSIS  Diagnosis: Syncope  Assessment and Plan of Treatment: Neel Barrett is an 79 year old male with PMHx of HTN, HLD, CAD (s/p PCI), s/p PPM placement, IDDM2, and BPH who presented to the ED on 5/20/25 for complaints of passing out and admitted for syncope  Syncope:  CHF ruled out   EKG without signs of ischemia  CT head without acute intracranial hemorrhage but with prominent b/l frontal parietal extra-axial spaces 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 volume loss  CT C-spine without acute fracture or traumatic subluxation but with multi-level degenerative changes.  CXR without acute finding  Echo with preserved EF  AICD interrogated, NSVT in tele. AICD setting adjusted,  BB dose increased   UTI ruled out  Clx neg, dc abx  Chronic medical conditions:  HTN: PTA metoprolol tartrate 25 mg BID  HLD: PTA atorvastatin 10 mg  CAD (s/p PCI): PTA ASA 81 mg, Not on Plavix, no indication currently, ( PCI 4 yrs ago and TIA yrs ago) isosorbide mononitrate 30 mg  IDDM2 with hyperglycemia:A1c 8.8, resume home med  BPH: PTA tamsulosin 0.4 mg, finasteride 5 mg  Normocytic anemia, chronic?: hgb stable, no signs of active bleeding  Thrombocytopenia, chronic?: plts stable       PRINCIPAL DISCHARGE DIAGNOSIS  Diagnosis: Syncope  Assessment and Plan of Treatment: Neel Barrett is an 79 year old male with PMHx of HTN, HLD, CAD (s/p PCI), s/p PPM placement, IDDM2, and BPH who presented to the ED on 5/20/25 for complaints of passing out and admitted for syncope  Syncope:  CHF ruled out   EKG without signs of ischemia  CT head without acute intracranial hemorrhage but with prominent b/l frontal parietal extra-axial spaces 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 volume loss  CT C-spine without acute fracture or traumatic subluxation but with multi-level degenerative changes.  CXR without acute finding  Echo with preserved EF  AICD interrogated, NSVT in tele. AICD setting adjusted,  BB dose increased   UTI ruled out  Clx neg, dc abx  Chronic medical conditions:  HTN: PTA metoprolol tartrate 25 mg BID, increased to 50 mg BID   HLD: PTA atorvastatin 10 mg  CAD (s/p PCI): PTA ASA 81 mg, Not on Plavix, no indication currently, ( PCI 4 yrs ago and TIA yrs ago) isosorbide mononitrate 30 mg  IDDM2 with hyperglycemia:A1c 8.8, resume home med  BPH: PTA tamsulosin 0.4 mg, finasteride 5 mg  Normocytic anemia, chronic?: hgb stable, no signs of active bleeding  Thrombocytopenia, chronic?: plts stable

## 2025-05-23 NOTE — PROGRESS NOTE ADULT - SUBJECTIVE AND OBJECTIVE BOX
Patient is a 79y old  Male who presents with a chief complaint of Syncope    PAST MEDICAL & SURGICAL HISTORY:  HTN (Hypertension)    CVA    HLD (hyperlipidemia)    CAD S/P percutaneous coronary angioplasty    Insulin dependent type 2 diabetes mellitus    BPH (benign prostatic hyperplasia)    S/P coronary artery stent placement    AICD (Yisel scie)    INTERVAL HISTORY:  	  MEDICATIONS:  MEDICATIONS  (STANDING):  aspirin  chewable 81 milliGRAM(s) Oral daily  atorvastatin 10 milliGRAM(s) Oral at bedtime  clopidogrel Tablet 75 milliGRAM(s) Oral daily  finasteride 5 milliGRAM(s) Oral daily  furosemide   Injectable 40 milliGRAM(s) IV Push daily  insulin glargine Injectable (LANTUS) 26 Unit(s) SubCutaneous at bedtime  insulin lispro (ADMELOG) corrective regimen sliding scale   SubCutaneous three times a day before meals  metoprolol tartrate 50 milliGRAM(s) Oral two times a day  tamsulosin 0.4 milliGRAM(s) Oral at bedtime    MEDICATIONS  (PRN):  acetaminophen     Tablet .. 650 milliGRAM(s) Oral every 6 hours PRN Temp greater or equal to 38C (100.4F), Mild Pain (1 - 3)  aluminum hydroxide/magnesium hydroxide/simethicone Suspension 30 milliLiter(s) Oral every 4 hours PRN Dyspepsia  dextrose Oral Gel 15 Gram(s) Oral once PRN Blood Glucose LESS THAN 70 milliGRAM(s)/deciliter  melatonin 3 milliGRAM(s) Oral at bedtime PRN Insomnia  ondansetron Injectable 4 milliGRAM(s) IV Push every 8 hours PRN Nausea and/or Vomiting    Vitals:  T(F): 97.4 (05-23-25 @ 04:51), Max: 98.5 (05-22-25 @ 15:57)  HR: 72 (05-23-25 @ 04:51) (71 - 87)  BP: 120/76 (05-23-25 @ 04:51) (117/65 - 133/80)  RR: 18 (05-23-25 @ 04:51) (18 - 18)  SpO2: 98% (05-23-25 @ 04:51) (97% - 99%)    05-22 @ 07:01  -  05-23 @ 07:00  --------------------------------------------------------  IN:    Oral Fluid: 1280 mL  Total IN: 1280 mL    OUT:    Voided (mL): 1300 mL  Total OUT: 1300 mL    Total NET: -20 mL    05-23 @ 07:01  -  05-23 @ 09:50  --------------------------------------------------------  IN:  Total IN: 0 mL    OUT:    Voided (mL): 900 mL  Total OUT: 900 mL    Total NET: -900 mL    Weight (kg): 86.9 (05-21 @ 00:50)    PHYSICAL EXAM:  Neuro: Awake, responsive  CV: S1 S2 RRR  Lungs: CTABL  GI: Soft, BS +, ND, NT  Extremities: No edema    TELEMETRY: sinus     RADIOLOGY: < from: Xray Chest 1 View- PORTABLE-Urgent (05.20.25 @ 11:45) >  IMPRESSION: Left-sided defibrillator new since September 2018. No acute   finding.    < end of copied text >    DIAGNOSTIC TESTING:    [x ] Echocardiogram: < from: TTE Echo Complete w/o Contrast w/ Doppler (05.20.25 @ 20:04) >   1. Left ventricular cavity is normal in size. Left ventricular systolic   function is normal with an ejection fraction of 57 % by Willingham's method   of disks.   2. Mild mitral regurgitation.   3. Mild aortic regurgitation.   4. Left atrium is mildly dilated.   5. No pericardial effusion seen.    < end of copied text >    LABS:	 	    CARDIAC MARKERS:  Troponin I, High Sensitivity Result: 151.1 ng/L (05-21 @ 07:30)  Troponin I, High Sensitivity Result: 148.1 ng/L (05-20 @ 22:50)  Troponin I, High Sensitivity Result: 135.1 ng/L (05-20 @ 15:07)  Troponin I, High Sensitivity Result: 124.3 ng/L (05-20 @ 10:21)    23 May 2025 07:23    139    |  107    |  27     ----------------------------<  159    3.7     |  29     |  1.32   22 May 2025 17:40    139    |  106    |  30     ----------------------------<  173    3.9     |  28     |  1.52   21 May 2025 07:30    140    |  110    |  15     ----------------------------<  169    4.0     |  29     |  1.22     Ca    8.7        23 May 2025 07:23  Phos  3.2       22 May 2025 17:40  Mg     1.7       22 May 2025 17:40                        12.5   6.00  )-----------( 142      ( 20 May 2025 10:21 )             39.6   TSH: Thyroid Stimulating Hormone, Serum: 2.660 uU/mL (05-23 @ 07:23)    INR: 0.96 ratio (05-20 @ 10:21)           Patient is a 79y old  Male who presents with a chief complaint of Syncope    PAST MEDICAL & SURGICAL HISTORY:  HTN (Hypertension)    CVA    HLD (hyperlipidemia)    CAD S/P percutaneous coronary angioplasty    Insulin dependent type 2 diabetes mellitus    BPH (benign prostatic hyperplasia)    S/P coronary artery stent placement    AICD (Yisel scie)    INTERVAL HISTORY: in no distress  	  MEDICATIONS:  MEDICATIONS  (STANDING):  aspirin  chewable 81 milliGRAM(s) Oral daily  atorvastatin 10 milliGRAM(s) Oral at bedtime  clopidogrel Tablet 75 milliGRAM(s) Oral daily  finasteride 5 milliGRAM(s) Oral daily  furosemide   Injectable 40 milliGRAM(s) IV Push daily  insulin glargine Injectable (LANTUS) 26 Unit(s) SubCutaneous at bedtime  insulin lispro (ADMELOG) corrective regimen sliding scale   SubCutaneous three times a day before meals  metoprolol tartrate 50 milliGRAM(s) Oral two times a day  tamsulosin 0.4 milliGRAM(s) Oral at bedtime    MEDICATIONS  (PRN):  acetaminophen     Tablet .. 650 milliGRAM(s) Oral every 6 hours PRN Temp greater or equal to 38C (100.4F), Mild Pain (1 - 3)  aluminum hydroxide/magnesium hydroxide/simethicone Suspension 30 milliLiter(s) Oral every 4 hours PRN Dyspepsia  dextrose Oral Gel 15 Gram(s) Oral once PRN Blood Glucose LESS THAN 70 milliGRAM(s)/deciliter  melatonin 3 milliGRAM(s) Oral at bedtime PRN Insomnia  ondansetron Injectable 4 milliGRAM(s) IV Push every 8 hours PRN Nausea and/or Vomiting    Vitals:  T(F): 97.4 (05-23-25 @ 04:51), Max: 98.5 (05-22-25 @ 15:57)  HR: 72 (05-23-25 @ 04:51) (71 - 87)  BP: 120/76 (05-23-25 @ 04:51) (117/65 - 133/80)  RR: 18 (05-23-25 @ 04:51) (18 - 18)  SpO2: 98% (05-23-25 @ 04:51) (97% - 99%)    05-22 @ 07:01  -  05-23 @ 07:00  --------------------------------------------------------  IN:    Oral Fluid: 1280 mL  Total IN: 1280 mL    OUT:    Voided (mL): 1300 mL  Total OUT: 1300 mL    Total NET: -20 mL    05-23 @ 07:01  -  05-23 @ 09:50  --------------------------------------------------------  IN:  Total IN: 0 mL    OUT:    Voided (mL): 900 mL  Total OUT: 900 mL    Total NET: -900 mL    Weight (kg): 86.9 (05-21 @ 00:50)    PHYSICAL EXAM:  Neuro: Awake, responsive  CV: S1 S2 RRR  Lungs: CTABL  GI: Soft, BS +, ND, NT  Extremities: No edema    TELEMETRY: sinus     RADIOLOGY: < from: Xray Chest 1 View- PORTABLE-Urgent (05.20.25 @ 11:45) >  IMPRESSION: Left-sided defibrillator new since September 2018. No acute   finding.    < end of copied text >    DIAGNOSTIC TESTING:    [x ] Echocardiogram: < from: TTE Echo Complete w/o Contrast w/ Doppler (05.20.25 @ 20:04) >   1. Left ventricular cavity is normal in size. Left ventricular systolic   function is normal with an ejection fraction of 57 % by Willingham's method   of disks.   2. Mild mitral regurgitation.   3. Mild aortic regurgitation.   4. Left atrium is mildly dilated.   5. No pericardial effusion seen.    < end of copied text >    LABS:	 	    CARDIAC MARKERS:  Troponin I, High Sensitivity Result: 151.1 ng/L (05-21 @ 07:30)  Troponin I, High Sensitivity Result: 148.1 ng/L (05-20 @ 22:50)  Troponin I, High Sensitivity Result: 135.1 ng/L (05-20 @ 15:07)  Troponin I, High Sensitivity Result: 124.3 ng/L (05-20 @ 10:21)    23 May 2025 07:23    139    |  107    |  27     ----------------------------<  159    3.7     |  29     |  1.32   22 May 2025 17:40    139    |  106    |  30     ----------------------------<  173    3.9     |  28     |  1.52   21 May 2025 07:30    140    |  110    |  15     ----------------------------<  169    4.0     |  29     |  1.22     Ca    8.7        23 May 2025 07:23  Phos  3.2       22 May 2025 17:40  Mg     1.7       22 May 2025 17:40                        12.5   6.00  )-----------( 142      ( 20 May 2025 10:21 )             39.6   TSH: Thyroid Stimulating Hormone, Serum: 2.660 uU/mL (05-23 @ 07:23)    INR: 0.96 ratio (05-20 @ 10:21)           Patient is a 79y old  Male who presents with a chief complaint of Syncope    PAST MEDICAL & SURGICAL HISTORY:  HTN (Hypertension)    CVA    HLD (hyperlipidemia)    CAD S/P percutaneous coronary angioplasty    ischemic cardiomyopathy    Insulin dependent type 2 diabetes mellitus    BPH (benign prostatic hyperplasia)    S/P coronary artery stent placement    s/p AICD (Yisel scie)    INTERVAL HISTORY: in no distress  	  MEDICATIONS:  MEDICATIONS  (STANDING):  aspirin  chewable 81 milliGRAM(s) Oral daily  atorvastatin 10 milliGRAM(s) Oral at bedtime  clopidogrel Tablet 75 milliGRAM(s) Oral daily  finasteride 5 milliGRAM(s) Oral daily  furosemide   Injectable 40 milliGRAM(s) IV Push daily  insulin glargine Injectable (LANTUS) 26 Unit(s) SubCutaneous at bedtime  insulin lispro (ADMELOG) corrective regimen sliding scale   SubCutaneous three times a day before meals  metoprolol tartrate 50 milliGRAM(s) Oral two times a day  tamsulosin 0.4 milliGRAM(s) Oral at bedtime    MEDICATIONS  (PRN):  acetaminophen     Tablet .. 650 milliGRAM(s) Oral every 6 hours PRN Temp greater or equal to 38C (100.4F), Mild Pain (1 - 3)  aluminum hydroxide/magnesium hydroxide/simethicone Suspension 30 milliLiter(s) Oral every 4 hours PRN Dyspepsia  dextrose Oral Gel 15 Gram(s) Oral once PRN Blood Glucose LESS THAN 70 milliGRAM(s)/deciliter  melatonin 3 milliGRAM(s) Oral at bedtime PRN Insomnia  ondansetron Injectable 4 milliGRAM(s) IV Push every 8 hours PRN Nausea and/or Vomiting    Vitals:  T(F): 97.4 (05-23-25 @ 04:51), Max: 98.5 (05-22-25 @ 15:57)  HR: 72 (05-23-25 @ 04:51) (71 - 87)  BP: 120/76 (05-23-25 @ 04:51) (117/65 - 133/80)  RR: 18 (05-23-25 @ 04:51) (18 - 18)  SpO2: 98% (05-23-25 @ 04:51) (97% - 99%)    05-22 @ 07:01  -  05-23 @ 07:00  --------------------------------------------------------  IN:    Oral Fluid: 1280 mL  Total IN: 1280 mL    OUT:    Voided (mL): 1300 mL  Total OUT: 1300 mL    Total NET: -20 mL    05-23 @ 07:01  -  05-23 @ 09:50  --------------------------------------------------------  IN:  Total IN: 0 mL    OUT:    Voided (mL): 900 mL  Total OUT: 900 mL    Total NET: -900 mL    Weight (kg): 86.9 (05-21 @ 00:50)    PHYSICAL EXAM:  Neuro: Awake, responsive  CV: S1 S2 RRR  Lungs: CTABL  GI: Soft, BS +, ND, NT  Extremities: No edema    TELEMETRY: sinus     RADIOLOGY: < from: Xray Chest 1 View- PORTABLE-Urgent (05.20.25 @ 11:45) >  IMPRESSION: Left-sided defibrillator new since September 2018. No acute   finding.    < end of copied text >    DIAGNOSTIC TESTING:    [x ] Echocardiogram: < from: TTE Echo Complete w/o Contrast w/ Doppler (05.20.25 @ 20:04) >   1. Left ventricular cavity is normal in size. Left ventricular systolic   function is normal with an ejection fraction of 57 % by Willingham's method   of disks.   2. Mild mitral regurgitation.   3. Mild aortic regurgitation.   4. Left atrium is mildly dilated.   5. No pericardial effusion seen.    < end of copied text >    LABS:	 	    CARDIAC MARKERS:  Troponin I, High Sensitivity Result: 151.1 ng/L (05-21 @ 07:30)  Troponin I, High Sensitivity Result: 148.1 ng/L (05-20 @ 22:50)  Troponin I, High Sensitivity Result: 135.1 ng/L (05-20 @ 15:07)  Troponin I, High Sensitivity Result: 124.3 ng/L (05-20 @ 10:21)    23 May 2025 07:23    139    |  107    |  27     ----------------------------<  159    3.7     |  29     |  1.32   22 May 2025 17:40    139    |  106    |  30     ----------------------------<  173    3.9     |  28     |  1.52   21 May 2025 07:30    140    |  110    |  15     ----------------------------<  169    4.0     |  29     |  1.22     Ca    8.7        23 May 2025 07:23  Phos  3.2       22 May 2025 17:40  Mg     1.7       22 May 2025 17:40                        12.5   6.00  )-----------( 142      ( 20 May 2025 10:21 )             39.6   TSH: Thyroid Stimulating Hormone, Serum: 2.660 uU/mL (05-23 @ 07:23)    INR: 0.96 ratio (05-20 @ 10:21)

## 2025-05-23 NOTE — DISCHARGE NOTE NURSING/CASE MANAGEMENT/SOCIAL WORK - PATIENT PORTAL LINK FT
You can access the FollowMyHealth Patient Portal offered by [facility] by registering at the following website: [email or web address]. By joining [facility]’s FollowMyHealth portal, you will also be able to view your health information using other applications (apps) compatible with our system.

## 2025-05-23 NOTE — PROGRESS NOTE ADULT - ASSESSMENT
79 F HTN HLD DM CAD/PCI AICD with syncope escalating MARTINEZ  5/20/25 TTE LVEF 58% LAE mild, MR mild AR mild chronic RBBB  BS ICD 2019    Device interrogated, no dysfunction, no pathologic arrythmia correlating with syncope   17 beats NSVT noted on tele around 3 am 5/22, AG[employer]P Rep to reprogram the VT rate on ICD   increased metoprolol to 50 bid   ?need for DAPT, no recent PCI, as per pt he is on asa only  IV Lasix 40 can be switched to po   keep k >4, Mg >2, phos >3  cont atorvastatin 10/d  hold Imdur   no orthostasis  increase activity as tolerated   outpatient follow up with Dr. Powers, pt's primary cardiologist    79 F HTN HLD DM CAD/PCI ischemic cardiomyopathy s/p AICD with syncope escalating MARTINEZ  5/20/25 TTE LVEF 58% LAE mild, MR mild AR mild chronic RBBB  BS ICD 2019    Device interrogated, no dysfunction, no pathologic arrythmia correlating with syncope   17 beats NSVT noted on tele around 3 am 5/22, [employer] Rep to reprogram the VT rate on ICD   increased metoprolol to 50 bid   ?need for DAPT, no recent PCI, as per pt he is on asa only  IV Lasix 40 can be switched to po   keep k >4, Mg >2, phos >3  cont atorvastatin 10/d  hold Imdur   no orthostasis  increase activity as tolerated   outpatient follow up with Dr. Powers, pt's primary cardiologist

## 2025-05-23 NOTE — DISCHARGE NOTE PROVIDER - NSDCMRMEDTOKEN_GEN_ALL_CORE_FT
aspirin 81 mg oral tablet: 1 tab(s) orally once a day  atorvastatin 10 mg oral tablet: 1 tab(s) orally once a day  finasteride 5 mg oral tablet: 1 tab(s) orally once a day  Flomax 0.4 mg oral capsule: 1 cap(s) orally once a day  isosorbide mononitrate 30 mg oral tablet, extended release: 1 tab(s) orally once a day  Lantus 100 units/mL subcutaneous solution: 26 unit(s) subcutaneous once a day (at bedtime)  metFORMIN 1000 mg oral tablet: 1 tab(s) orally 2 times a day  metoprolol tartrate 25 mg oral tablet: 1 tab(s) orally 2 times a day  Plavix 75 mg oral tablet: 1 tab(s) orally once a day  Trulicity Pen 1.5 mg/0.5 mL subcutaneous solution: 1.5 milligram(s) subcutaneously once a week on Fridays   aspirin 81 mg oral tablet: 1 tab(s) orally once a day  atorvastatin 10 mg oral tablet: 1 tab(s) orally once a day  finasteride 5 mg oral tablet: 1 tab(s) orally once a day  Flomax 0.4 mg oral capsule: 1 cap(s) orally once a day  Lantus 100 units/mL subcutaneous solution: 26 unit(s) subcutaneous once a day (at bedtime)  metFORMIN 1000 mg oral tablet: 1 tab(s) orally 2 times a day  metoprolol tartrate 50 mg oral tablet: 1 tab(s) orally 2 times a day  Trulicity Pen 1.5 mg/0.5 mL subcutaneous solution: 1.5 milligram(s) subcutaneously once a week on Fridays

## 2025-05-23 NOTE — DISCHARGE NOTE PROVIDER - ATTENDING DISCHARGE PHYSICAL EXAMINATION:
Vital Signs Last 24 Hrs  T(C): 36.6 (23 May 2025 10:19), Max: 36.9 (22 May 2025 15:57)  T(F): 97.8 (23 May 2025 10:19), Max: 98.5 (22 May 2025 15:57)  HR: 66 (23 May 2025 10:19) (66 - 87)  BP: 121/67 (23 May 2025 10:19) (117/65 - 131/75)  BP(mean): --  RR: 18 (23 May 2025 10:19) (18 - 18)  SpO2: 98% (23 May 2025 10:19) (97% - 99%)    Parameters below as of 22 May 2025 20:10  Patient On (Oxygen Delivery Method): room air    GENERAL: NAD  HEAD:  Atraumatic  EYES: PERRLA  ENMT: Mouth moist  NECK: Supple  NERVOUS SYSTEM:  Awake, alert  CHEST/LUNG: Clear  HEART: RRR, S1, S2  ABDOMEN: Soft, non tender  EXTREMITIES:  no edema BL LE  SKIN: No rash

## 2025-05-23 NOTE — DISCHARGE NOTE PROVIDER - PROVIDER TOKENS
FREE:[LAST:[pcp],PHONE:[(   )    -],FAX:[(   )    -]],FREE:[LAST:[primary cardiologist],PHONE:[(   )    -],FAX:[(   )    -]]

## 2025-05-23 NOTE — PROGRESS NOTE ADULT - REASON FOR ADMISSION
Syncope, need to r/o new onset CHF

## 2025-05-23 NOTE — DISCHARGE NOTE NURSING/CASE MANAGEMENT/SOCIAL WORK - FINANCIAL ASSISTANCE
[facility] provides services at a reduced cost to those who are determined to be eligible through [facility]’s financial assistance program. Information regarding [facility]’s financial assistance program can be found by going to [email or web address] or by calling [phone].

## 2025-05-23 NOTE — DISCHARGE NOTE PROVIDER - CARE PROVIDER_API CALL
pcp,   Phone: (   )    -  Fax: (   )    -  Follow Up Time:     primary cardiologist,   Phone: (   )    -  Fax: (   )    -  Follow Up Time:

## 2025-05-28 ENCOUNTER — TRANSCRIPTION ENCOUNTER (OUTPATIENT)
Age: 80
End: 2025-05-28

## 2025-05-28 PROBLEM — E11.9 TYPE 2 DIABETES MELLITUS WITHOUT COMPLICATIONS: Chronic | Status: ACTIVE | Noted: 2025-05-20

## 2025-05-28 PROBLEM — I25.10 ATHEROSCLEROTIC HEART DISEASE OF NATIVE CORONARY ARTERY WITHOUT ANGINA PECTORIS: Chronic | Status: ACTIVE | Noted: 2025-05-20

## 2025-05-28 PROBLEM — N40.0 BENIGN PROSTATIC HYPERPLASIA WITHOUT LOWER URINARY TRACT SYMPTOMS: Chronic | Status: ACTIVE | Noted: 2025-05-20

## 2025-05-28 PROBLEM — E78.5 HYPERLIPIDEMIA, UNSPECIFIED: Chronic | Status: ACTIVE | Noted: 2025-05-20

## 2025-05-28 NOTE — H&P ADULT - NSHPPHYSICALEXAM_GEN_ALL_CORE
Rx Refill Note  Requested Prescriptions     Pending Prescriptions Disp Refills    losartan (COZAAR) 50 MG tablet [Pharmacy Med Name: Losartan Potassium 50 MG Oral Tablet] 135 tablet 0     Sig: TAKE 1 & 1/2 (ONE & ONE-HALF) TABLETS BY MOUTH ONCE DAILY      Last office visit with prescribing clinician: 1/8/2025     Next office visit with prescribing clinician: 7/22/2025     Etta Esparza MA  05/28/25, 10:43 EDT   
T(C): 36.7 (05-20-25 @ 15:20), Max: 36.7 (05-20-25 @ 15:20)  HR: 100 (05-20-25 @ 18:14) (82 - 100)  BP: 159/98 (05-20-25 @ 18:14) (159/98 - 173/89)  RR: 20 (05-20-25 @ 18:14) (18 - 20)  SpO2: 99% (05-20-25 @ 18:14) (98% - 99%)    CONSTITUTIONAL: Well groomed, no apparent distress, pleasant, conversational  EYES: L. eye blindness  ENMT: Oral mucosa with moist membranes  RESP: No respiratory distress, no use of accessory muscles; diminished breath sounds b/l  CV: RRR  GI: Soft, NT, ND  SKIN: b/l LE 2+ pitting edema

## 2025-05-29 ENCOUNTER — TRANSCRIPTION ENCOUNTER (OUTPATIENT)
Age: 80
End: 2025-05-29

## 2025-05-30 ENCOUNTER — EMERGENCY (EMERGENCY)
Facility: HOSPITAL | Age: 80
LOS: 0 days | Discharge: HOME | End: 2025-05-31
Attending: STUDENT IN AN ORGANIZED HEALTH CARE EDUCATION/TRAINING PROGRAM
Payer: MEDICARE

## 2025-05-30 ENCOUNTER — APPOINTMENT (OUTPATIENT)
Age: 80
End: 2025-05-30
Payer: MEDICARE

## 2025-05-30 VITALS
TEMPERATURE: 99 F | OXYGEN SATURATION: 99 % | HEART RATE: 108 BPM | DIASTOLIC BLOOD PRESSURE: 99 MMHG | SYSTOLIC BLOOD PRESSURE: 136 MMHG | RESPIRATION RATE: 17 BRPM

## 2025-05-30 VITALS
WEIGHT: 199.96 LBS | TEMPERATURE: 99 F | HEART RATE: 121 BPM | RESPIRATION RATE: 16 BRPM | HEIGHT: 70 IN | OXYGEN SATURATION: 100 % | SYSTOLIC BLOOD PRESSURE: 180 MMHG | DIASTOLIC BLOOD PRESSURE: 98 MMHG

## 2025-05-30 DIAGNOSIS — I10 ESSENTIAL (PRIMARY) HYPERTENSION: ICD-10-CM

## 2025-05-30 DIAGNOSIS — E11.9 TYPE 2 DIABETES MELLITUS WITHOUT COMPLICATIONS: ICD-10-CM

## 2025-05-30 DIAGNOSIS — Z95.5 PRESENCE OF CORONARY ANGIOPLASTY IMPLANT AND GRAFT: ICD-10-CM

## 2025-05-30 DIAGNOSIS — R82.71 BACTERIURIA: ICD-10-CM

## 2025-05-30 DIAGNOSIS — Z95.0 PRESENCE OF CARDIAC PACEMAKER: ICD-10-CM

## 2025-05-30 DIAGNOSIS — I25.10 ATHEROSCLEROTIC HEART DISEASE OF NATIVE CORONARY ARTERY WITHOUT ANGINA PECTORIS: ICD-10-CM

## 2025-05-30 DIAGNOSIS — R61 GENERALIZED HYPERHIDROSIS: ICD-10-CM

## 2025-05-30 DIAGNOSIS — Z95.5 PRESENCE OF CORONARY ANGIOPLASTY IMPLANT AND GRAFT: Chronic | ICD-10-CM

## 2025-05-30 DIAGNOSIS — R29.6 REPEATED FALLS: ICD-10-CM

## 2025-05-30 DIAGNOSIS — R00.0 TACHYCARDIA, UNSPECIFIED: ICD-10-CM

## 2025-05-30 DIAGNOSIS — E11.9 TYPE 2 DIABETES MELLITUS W/OUT COMPLICATIONS: ICD-10-CM

## 2025-05-30 DIAGNOSIS — R68.83 CHILLS (WITHOUT FEVER): ICD-10-CM

## 2025-05-30 DIAGNOSIS — E78.5 HYPERLIPIDEMIA, UNSPECIFIED: ICD-10-CM

## 2025-05-30 DIAGNOSIS — N40.0 BENIGN PROSTATIC HYPERPLASIA WITHOUT LOWER URINARY TRACT SYMPTOMS: ICD-10-CM

## 2025-05-30 LAB
ALBUMIN SERPL ELPH-MCNC: 2.9 G/DL — LOW (ref 3.3–5)
ALP SERPL-CCNC: 120 U/L — SIGNIFICANT CHANGE UP (ref 40–120)
ALT FLD-CCNC: 33 U/L — SIGNIFICANT CHANGE UP (ref 12–78)
ANION GAP SERPL CALC-SCNC: 4 MMOL/L — LOW (ref 5–17)
APPEARANCE UR: CLEAR — SIGNIFICANT CHANGE UP
AST SERPL-CCNC: 29 U/L — SIGNIFICANT CHANGE UP (ref 15–37)
BACTERIA # UR AUTO: ABNORMAL /HPF
BASOPHILS # BLD AUTO: 0.03 K/UL — SIGNIFICANT CHANGE UP (ref 0–0.2)
BASOPHILS NFR BLD AUTO: 0.4 % — SIGNIFICANT CHANGE UP (ref 0–2)
BILIRUB SERPL-MCNC: 0.2 MG/DL — SIGNIFICANT CHANGE UP (ref 0.2–1.2)
BILIRUB UR-MCNC: NEGATIVE — SIGNIFICANT CHANGE UP
BUN SERPL-MCNC: 16 MG/DL — SIGNIFICANT CHANGE UP (ref 7–23)
CALCIUM SERPL-MCNC: 9.8 MG/DL — SIGNIFICANT CHANGE UP (ref 8.5–10.1)
CHLORIDE SERPL-SCNC: 108 MMOL/L — SIGNIFICANT CHANGE UP (ref 96–108)
CO2 SERPL-SCNC: 30 MMOL/L — SIGNIFICANT CHANGE UP (ref 22–31)
COLOR SPEC: YELLOW — SIGNIFICANT CHANGE UP
CREAT SERPL-MCNC: 1.33 MG/DL — HIGH (ref 0.5–1.3)
DIFF PNL FLD: ABNORMAL
EGFR: 54 ML/MIN/1.73M2 — LOW
EGFR: 54 ML/MIN/1.73M2 — LOW
EOSINOPHIL # BLD AUTO: 0.02 K/UL — SIGNIFICANT CHANGE UP (ref 0–0.5)
EOSINOPHIL NFR BLD AUTO: 0.3 % — SIGNIFICANT CHANGE UP (ref 0–6)
EPI CELLS # UR: PRESENT
FLUAV AG NPH QL: SIGNIFICANT CHANGE UP
FLUBV AG NPH QL: SIGNIFICANT CHANGE UP
GLUCOSE SERPL-MCNC: 269 MG/DL — HIGH (ref 70–99)
GLUCOSE UR QL: 500 MG/DL
HCT VFR BLD CALC: 40.3 % — SIGNIFICANT CHANGE UP (ref 39–50)
HGB BLD-MCNC: 12.7 G/DL — LOW (ref 13–17)
IMM GRANULOCYTES NFR BLD AUTO: 0.3 % — SIGNIFICANT CHANGE UP (ref 0–0.9)
KETONES UR QL: NEGATIVE MG/DL — SIGNIFICANT CHANGE UP
LACTATE SERPL-SCNC: 1.9 MMOL/L — SIGNIFICANT CHANGE UP (ref 0.7–2)
LEUKOCYTE ESTERASE UR-ACNC: NEGATIVE — SIGNIFICANT CHANGE UP
LYMPHOCYTES # BLD AUTO: 2.14 K/UL — SIGNIFICANT CHANGE UP (ref 1–3.3)
LYMPHOCYTES # BLD AUTO: 31.8 % — SIGNIFICANT CHANGE UP (ref 13–44)
MCHC RBC-ENTMCNC: 29.1 PG — SIGNIFICANT CHANGE UP (ref 27–34)
MCHC RBC-ENTMCNC: 31.5 G/DL — LOW (ref 32–36)
MCV RBC AUTO: 92.4 FL — SIGNIFICANT CHANGE UP (ref 80–100)
MONOCYTES # BLD AUTO: 0.5 K/UL — SIGNIFICANT CHANGE UP (ref 0–0.9)
MONOCYTES NFR BLD AUTO: 7.4 % — SIGNIFICANT CHANGE UP (ref 2–14)
NEUTROPHILS # BLD AUTO: 4.03 K/UL — SIGNIFICANT CHANGE UP (ref 1.8–7.4)
NEUTROPHILS NFR BLD AUTO: 59.8 % — SIGNIFICANT CHANGE UP (ref 43–77)
NITRITE UR-MCNC: NEGATIVE — SIGNIFICANT CHANGE UP
NRBC BLD AUTO-RTO: 0 /100 WBCS — SIGNIFICANT CHANGE UP (ref 0–0)
PH UR: 8 — SIGNIFICANT CHANGE UP (ref 5–8)
PLATELET # BLD AUTO: 171 K/UL — SIGNIFICANT CHANGE UP (ref 150–400)
POTASSIUM SERPL-MCNC: 5.1 MMOL/L — SIGNIFICANT CHANGE UP (ref 3.5–5.3)
POTASSIUM SERPL-SCNC: 5.1 MMOL/L — SIGNIFICANT CHANGE UP (ref 3.5–5.3)
PROT SERPL-MCNC: 7.1 GM/DL — SIGNIFICANT CHANGE UP (ref 6–8.3)
PROT UR-MCNC: >=1000 MG/DL
RBC # BLD: 4.36 M/UL — SIGNIFICANT CHANGE UP (ref 4.2–5.8)
RBC # FLD: 12.7 % — SIGNIFICANT CHANGE UP (ref 10.3–14.5)
RBC CASTS # UR COMP ASSIST: 4 /HPF — SIGNIFICANT CHANGE UP (ref 0–4)
RSV RNA NPH QL NAA+NON-PROBE: SIGNIFICANT CHANGE UP
SARS-COV-2 RNA SPEC QL NAA+PROBE: SIGNIFICANT CHANGE UP
SODIUM SERPL-SCNC: 142 MMOL/L — SIGNIFICANT CHANGE UP (ref 135–145)
SOURCE RESPIRATORY: SIGNIFICANT CHANGE UP
SP GR SPEC: 1.03 — SIGNIFICANT CHANGE UP (ref 1–1.03)
UROBILINOGEN FLD QL: 0.2 MG/DL — SIGNIFICANT CHANGE UP (ref 0.2–1)
WBC # BLD: 6.74 K/UL — SIGNIFICANT CHANGE UP (ref 3.8–10.5)
WBC # FLD AUTO: 6.74 K/UL — SIGNIFICANT CHANGE UP (ref 3.8–10.5)
WBC UR QL: 2 /HPF — SIGNIFICANT CHANGE UP (ref 0–5)

## 2025-05-30 PROCEDURE — 71045 X-RAY EXAM CHEST 1 VIEW: CPT | Mod: 26

## 2025-05-30 PROCEDURE — 99348 HOME/RES VST EST LOW MDM 30: CPT | Mod: 2W

## 2025-05-30 PROCEDURE — 99285 EMERGENCY DEPT VISIT HI MDM: CPT

## 2025-05-30 RX ORDER — CEPHALEXIN 250 MG/1
1 CAPSULE ORAL
Qty: 28 | Refills: 0
Start: 2025-05-30 | End: 2025-06-05

## 2025-05-30 RX ORDER — INSULIN GLARGINE 100 [IU]/ML
100 INJECTION, SOLUTION SUBCUTANEOUS AT BEDTIME
Refills: 0 | Status: ACTIVE | COMMUNITY

## 2025-05-30 RX ORDER — METOPROLOL TARTRATE 50 MG/1
50 TABLET ORAL
Refills: 0 | Status: ACTIVE | COMMUNITY

## 2025-05-30 RX ORDER — ASPIRIN 81 MG/1
81 TABLET, CHEWABLE ORAL
Refills: 0 | Status: ACTIVE | COMMUNITY

## 2025-05-30 NOTE — ED PROVIDER NOTE - CHIEF COMPLAINT
OPERATIVE NOTE    PATIENT INFO:  · Name: Beto Nath  · Medical Record Number: [id number]    DATE OF OPERATION: 11/21/22    PREOPERATIVE DIAGNOSIS:  1.  Grade II internal hemorrhoids    POSTOPERATIVE DIAGNOSIS:  1.  same  Post-Op Diagnosis Codes:     * Grade II hemorrhoids [K64.1]    PROCEDURE PERFORMED:  1. Hemorrhoidal banding  2. Anoscopy  3.   Procedure(s):  HEMORRHOIDAL BANDING    SURGEON: Lalo Valenzuela MD    ASSISTANT: none    ANESTHESIA TYPE: MAC    ANESTHESIA PROVIDER: Vaughn Murphy MD      INDICATIONS FOR PROCEDURE:  Beto Nath is a 62 year old male who has been referred to surgery clinic for evaluation and treatment of hemorrhoids.  Patient states that for approximately the last year he has had on and off rectal bleeding.  He states that he has had history of hemorrhoids in the past and he has had similar symptoms.  He noticed a small amount of blood in the toilet bowl and on the toilet paper when his bowel movements.  He states that he also has intermittent mild rectal pain with bowel movements.  He states he normally moves his bowels every day and once per day.  His last colonoscopy in 2019 and was normal short of external hemorrhoids.  Denies any nausea, vomiting, fevers, or chills.  He denies any abdominal pain.  He denies any history of ulcerative colitis or Crohn's disease.  He denies any family history of colorectal cancer.  He continued to have ongoing issues with bleeding so would like to undergo banding.  We had a long discussion with the pathophysiology and expected course of his hemorrhoids and he has elected for banding.  We discussed the risks including bleeding, infection, pain, stenosis, incontinence, complications of anesthesia and the likelihood that he may need multiple banding procedures to full treat his disease and he voiced understanding.         PROCEDURE DESCRIPTION IN DETAIL:  Prior to the operation the risks, benefits, and alternatives to the procedure were discussed with  the patient and informed consent was obtained.     The patient was taken to the operating room and placed in the lithotomy position. Pressure points were padded. A timeout was performed to confirm the correct patient, site, and operation. The patient was given appropriate pre-operative antibiotics, and appropriate DVT prophylaxis was provided with SCDs.      Anesthesia was induced. The terra-anal area was prepped and draped in sterile fashion. Using a mixture of marcaine / lidocaine and epinephrine, local anesthetic was injected in the subcutaneous tissues and a terra-anal block was completed by injecting additional local anesthetic in 4 quadrants around the anal canal.      On inspection of there terra-anal area we noted no lesions or masses. Anoscopy was then performed and we identified grade II internal hemorroids in the RA and RP positions.     Suction banding device was used to place bands at the base of the RA and RP columns    At the end of the case all sponge, needle, and instrument counts were correct. The patient tolerated the operation well and was transported to the recovery area in stable condition.      ESTIMATED BLOOD LOSS: minimal    DRAINS: None    SPECIMENS:   * No specimens in log *    IMPLANTS: None    COMPLICATIONS: None    CONDITION / DISPOSITION: PACU - hemodynamically stable.        Lalo Valenzuela MD  11/21/2022       The patient is a 79y Male complaining of cold symptoms.

## 2025-05-30 NOTE — ED PROVIDER NOTE - OBJECTIVE STATEMENT
see MDM
Viral Illness in Children    Your child was seen in the Emergency Department and diagnosed with a viral infection.    Viruses are tiny germs that can get into a person's body and cause illness. A virus is the most common cause of illness and fever among children. There are many different types of viruses, and they cause many types of illness, depending on what part of the body is affected. If the virus settles in the nose, throat, and lungs, it causes cough, congestion, and sometimes headache. If it settles in the stomach and intestinal tract, it may cause vomiting and diarrhea. Sometimes it causes vague symptoms of "feeling bad all over," with fussiness, poor appetite, poor sleeping, and lots of crying. A rash may also appear for the first few days, then fade away. Other symptoms can include earache, sore throat, and swollen glands.     A viral illness usually lasts 3 to 5 days, but sometimes it lasts longer, even up to 1 to 2 weeks.  ANTIBIOTICS DON’T HELP.     General tips for taking care of a child who has a viral infection:  -Have your child rest.   -Give your child acetaminophen (Tylenol) and/or ibuprofen (Advil, Motrin) for fever, pain, or fussiness. Read and follow all instructions on the label.   -Be careful when giving your child over-the-counter cold or flu medicines and acetaminophen at the same time. Many of these medicines also contain acetaminophen. Read the labels to make sure that you are not giving your child more than the recommended dose. Too much Tylenol can be harmful.   -Be careful with cough and cold medicines. Don't give them to children younger than 4 years, because they don't work for children that age and can even be harmful. For children 4 years and older, always follow all the instructions carefully. Make sure you know how much medicine to give and how long to use it. And use the dosing device if one is included.   -Attempt to give your child lots of fluids, enough so that the urine is light yellow or clear like water. This is very important if your child is vomiting or has diarrhea. Give your child sips of water or drinks such as Pedialyte. Pedialyte contains a mix of salt, sugar, and minerals. You can buy them at drugstores or grocery stores. Give these drinks as long as your child is throwing up or has diarrhea. Do not use them as the only source of liquids or food for more than 1 to 2 days.   -Keep your child home from school, , or other public places while he or she has a fever.   Follow up with your pediatrician in 1-2 days to make sure that your child is doing better.    Return to the Emergency Department if:  -Your child has symptoms of a viral illness for longer than expected.  Ask your child’s health care provider how long symptoms should last.  -Treatment at home is not controlling your child's symptoms or they are getting worse.  -Your child has signs of needing more fluids. These signs include sunken eyes with few tears, dry mouth with little or no spit, and little or no urine for 8-12 hours.  -Your child who is younger than 2 months has a temperature of 100.4°F (38°C) or higher if not already evaluated for that.  -Your child has trouble breathing.   -Your child has a severe headache or has a stiff neck.

## 2025-05-30 NOTE — ED PROVIDER NOTE - CLINICAL SUMMARY MEDICAL DECISION MAKING FREE TEXT BOX
78 y/o M with PMH HTN, HLD, CAD s/p stent, PPM, BPH, DM here today with chills and sweats at home. Patient states he has no other symptoms including CP, dyspnea, N/V/D. No documented fever.     In the ED, he is tachycardic.    GENERAL: Awake, alert, NAD  HEENT: NC/AT, moist mucous membranes  LUNGS: CTAB, no wheezes or crackles   CARDIAC: tachycardic, regular rhythm, no m/r/g  ABDOMEN: Soft,  non tender, non distended, no rebound, no guarding  BACK: No midline spinal tenderness, no CVA tenderness  EXT: No edema, no calf tenderness, no deformities.  NEURO: A&Ox3. Moving all extremities.  SKIN: Warm and dry. No rash.  PSYCH: Normal affect.    Exam as above.    Patient with chills, no documented fever.  Will perform infectious work up including labs/CXR, UA  Not meeting SIRS criteria at this time  WIll reassess following labs/imaging 78 y/o M with PMH HTN, HLD, CAD s/p stent, PPM, BPH, DM here today with chills and sweats at home. Patient states he has no other symptoms including CP, dyspnea, N/V/D. No documented fever.     In the ED, he is tachycardic.    GENERAL: Awake, alert, NAD  HEENT: NC/AT, moist mucous membranes  LUNGS: CTAB, no wheezes or crackles   CARDIAC: tachycardic, regular rhythm, no m/r/g  ABDOMEN: Soft,  non tender, non distended, no rebound, no guarding  BACK: No midline spinal tenderness, no CVA tenderness  EXT: No edema, no calf tenderness, no deformities.  NEURO: A&Ox3. Moving all extremities.  SKIN: Warm and dry. No rash.  PSYCH: Normal affect.    Exam as above.    Patient with chills, no documented fever.  Will perform infectious work up including labs/CXR, UA  Not meeting SIRS criteria at this time  WIll reassess following labs/imaging    Labs reviewed, urine with moderate bacteria but not overtly infectious; CXR w/o infiltrate, labs otherwise appear stable  WIll treat with keflex  Vitals improved, heart rate downtrending  Plan for discharge

## 2025-05-30 NOTE — ED PROVIDER NOTE - PATIENT PORTAL LINK FT
You can access the FollowMyHealth Patient Portal offered by [facility] by registering at the following website: [email or web address]. By joining [employer]’s FollowMyHealth portal, you will also be able to view your health information using other applications (apps) compatible with our system.

## 2025-05-30 NOTE — ED ADULT NURSE NOTE - NSFALLUNIVINTERV_ED_ALL_ED
Bed/Stretcher in lowest position, wheels locked, appropriate side rails in place/Call bell, personal items and telephone in reach/Instruct patient to call for assistance before getting out of bed/chair/stretcher/Non-slip footwear applied when patient is off stretcher/[place] to call system/Physically safe environment - no spills, clutter or unnecessary equipment/Purposeful proactive rounding/Room/bathroom lighting operational, light cord in reach

## 2025-05-30 NOTE — ED PROVIDER NOTE - NSFOLLOWUPINSTRUCTIONS_ED_ALL_ED_FT
Your labs showed possible urinary tract infection.    Take antibiotics as prescribed.    Follow up with your primary doctor.    Urinary Tract Infection    A urinary tract infection (UTI) is an infection of any part of the urinary tract, which includes the kidneys, ureters, bladder, and urethra. Risk factors include ignoring your need to urinate, wiping back to front if female, being an uncircumcised male, and having diabetes or a weak immune system. Symptoms include frequent urination, pain or burning with urination, foul smelling urine, cloudy urine, pain in the lower abdomen, blood in the urine, and fever. If you were prescribed an antibiotic medicine, take it as told by your health care provider. Do not stop taking the antibiotic even if you start to feel better.    SEEK IMMEDIATE MEDICAL CARE IF YOU HAVE ANY OF THE FOLLOWING SYMPTOMS: severe back or abdominal pain, fever, inability to keep fluids or medicine down, dizziness/lightheadedness, or a change in mental status.

## 2025-05-30 NOTE — ED ADULT TRIAGE NOTE - CHIEF COMPLAINT QUOTE
Pt complains of  sweating, shaking and cold x this morning. Pt with a history of stent, pacemaker, prostate problem.

## 2025-05-30 NOTE — ED PROVIDER NOTE - IV ALTEPLASE DOOR HIDDEN
For information on Fall & Injury Prevention, visit: [email or web address] OR  [email or web address] OR  https://www.cdc.gov/steadi/patient.html
show

## 2025-05-30 NOTE — ED CLERICAL - NS ED CARE COORDINATION INFORMATION
This patient is eligable for (or currently enrolled in) an outpatient care management program available through [employer]. This program can coordinate outpatient follow up and assist the patient in accessing a variety of outpatient resources.  If discharged from the ED, the patient will be contacted to see if any additional resources are needed.                                                                                    Please call the Nurse Clinical Call Center at [phone] with any questions or for assistance in discharge planning.

## 2025-06-05 DIAGNOSIS — Z95.5 PRESENCE OF CORONARY ANGIOPLASTY IMPLANT AND GRAFT: ICD-10-CM

## 2025-06-05 DIAGNOSIS — R55 SYNCOPE AND COLLAPSE: ICD-10-CM

## 2025-06-05 DIAGNOSIS — Z79.82 LONG TERM (CURRENT) USE OF ASPIRIN: ICD-10-CM

## 2025-06-05 DIAGNOSIS — Z45.02 ENCOUNTER FOR ADJUSTMENT AND MANAGEMENT OF AUTOMATIC IMPLANTABLE CARDIAC DEFIBRILLATOR: ICD-10-CM

## 2025-06-05 DIAGNOSIS — I47.29 OTHER VENTRICULAR TACHYCARDIA: ICD-10-CM

## 2025-06-05 DIAGNOSIS — Z79.4 LONG TERM (CURRENT) USE OF INSULIN: ICD-10-CM

## 2025-06-05 DIAGNOSIS — D64.9 ANEMIA, UNSPECIFIED: ICD-10-CM

## 2025-06-05 DIAGNOSIS — E11.65 TYPE 2 DIABETES MELLITUS WITH HYPERGLYCEMIA: ICD-10-CM

## 2025-06-05 DIAGNOSIS — N40.0 BENIGN PROSTATIC HYPERPLASIA WITHOUT LOWER URINARY TRACT SYMPTOMS: ICD-10-CM

## 2025-06-05 DIAGNOSIS — I25.10 ATHEROSCLEROTIC HEART DISEASE OF NATIVE CORONARY ARTERY WITHOUT ANGINA PECTORIS: ICD-10-CM

## 2025-06-05 DIAGNOSIS — E78.5 HYPERLIPIDEMIA, UNSPECIFIED: ICD-10-CM

## 2025-06-05 DIAGNOSIS — Z79.85 LONG-TERM (CURRENT) USE OF INJECTABLE NON-INSULIN ANTIDIABETIC DRUGS: ICD-10-CM

## 2025-06-05 DIAGNOSIS — I08.0 RHEUMATIC DISORDERS OF BOTH MITRAL AND AORTIC VALVES: ICD-10-CM

## 2025-06-05 DIAGNOSIS — Z79.84 LONG TERM (CURRENT) USE OF ORAL HYPOGLYCEMIC DRUGS: ICD-10-CM

## 2025-06-05 DIAGNOSIS — D69.6 THROMBOCYTOPENIA, UNSPECIFIED: ICD-10-CM

## 2025-06-05 DIAGNOSIS — I25.5 ISCHEMIC CARDIOMYOPATHY: ICD-10-CM

## 2025-06-05 DIAGNOSIS — I45.10 UNSPECIFIED RIGHT BUNDLE-BRANCH BLOCK: ICD-10-CM

## 2025-06-05 DIAGNOSIS — I10 ESSENTIAL (PRIMARY) HYPERTENSION: ICD-10-CM

## 2025-06-05 DIAGNOSIS — Z85.46 PERSONAL HISTORY OF MALIGNANT NEOPLASM OF PROSTATE: ICD-10-CM

## 2025-06-05 DIAGNOSIS — Z86.73 PERSONAL HISTORY OF TRANSIENT ISCHEMIC ATTACK (TIA), AND CEREBRAL INFARCTION WITHOUT RESIDUAL DEFICITS: ICD-10-CM

## 2025-06-10 ENCOUNTER — TRANSCRIPTION ENCOUNTER (OUTPATIENT)
Age: 80
End: 2025-06-10

## 2025-06-19 ENCOUNTER — APPOINTMENT (OUTPATIENT)
Age: 80
End: 2025-06-19
Payer: MEDICARE

## 2025-06-19 ENCOUNTER — TRANSCRIPTION ENCOUNTER (OUTPATIENT)
Age: 80
End: 2025-06-19

## 2025-06-19 PROBLEM — R39.89 SUSPECTED UTI: Status: ACTIVE | Noted: 2025-06-19 | Resolved: 2025-07-19

## 2025-06-19 PROCEDURE — 99347 HOME/RES VST EST SF MDM 20: CPT | Mod: 93

## 2025-06-19 RX ORDER — SULFAMETHOXAZOLE AND TRIMETHOPRIM 800; 160 MG/1; MG/1
800-160 TABLET ORAL TWICE DAILY
Qty: 6 | Refills: 0 | Status: ACTIVE | COMMUNITY
Start: 2025-06-19 | End: 1900-01-01

## 2025-08-02 ENCOUNTER — EMERGENCY (EMERGENCY)
Facility: HOSPITAL | Age: 80
LOS: 1 days | End: 2025-08-02
Attending: STUDENT IN AN ORGANIZED HEALTH CARE EDUCATION/TRAINING PROGRAM | Admitting: STUDENT IN AN ORGANIZED HEALTH CARE EDUCATION/TRAINING PROGRAM
Payer: MEDICARE

## 2025-08-02 VITALS
RESPIRATION RATE: 16 BRPM | HEIGHT: 71 IN | TEMPERATURE: 98 F | SYSTOLIC BLOOD PRESSURE: 135 MMHG | HEART RATE: 99 BPM | DIASTOLIC BLOOD PRESSURE: 76 MMHG | OXYGEN SATURATION: 98 % | WEIGHT: 199.96 LBS

## 2025-08-02 VITALS
OXYGEN SATURATION: 100 % | HEART RATE: 83 BPM | SYSTOLIC BLOOD PRESSURE: 152 MMHG | TEMPERATURE: 98 F | RESPIRATION RATE: 17 BRPM | DIASTOLIC BLOOD PRESSURE: 79 MMHG

## 2025-08-02 DIAGNOSIS — Z95.5 PRESENCE OF CORONARY ANGIOPLASTY IMPLANT AND GRAFT: Chronic | ICD-10-CM

## 2025-08-02 LAB
ALBUMIN SERPL ELPH-MCNC: 2.7 G/DL — LOW (ref 3.3–5)
ALBUMIN SERPL ELPH-MCNC: 2.8 G/DL — LOW (ref 3.3–5)
ALP SERPL-CCNC: 67 U/L — SIGNIFICANT CHANGE UP (ref 40–120)
ALP SERPL-CCNC: 77 U/L — SIGNIFICANT CHANGE UP (ref 40–120)
ALT FLD-CCNC: 63 U/L — HIGH (ref 4–41)
ALT FLD-CCNC: 9 U/L — SIGNIFICANT CHANGE UP (ref 4–41)
ANION GAP SERPL CALC-SCNC: 7 MMOL/L — SIGNIFICANT CHANGE UP (ref 7–14)
ANION GAP SERPL CALC-SCNC: 9 MMOL/L — SIGNIFICANT CHANGE UP (ref 7–14)
APPEARANCE UR: ABNORMAL
AST SERPL-CCNC: 11 U/L — SIGNIFICANT CHANGE UP (ref 4–40)
AST SERPL-CCNC: 88 U/L — HIGH (ref 4–40)
BACTERIA # UR AUTO: ABNORMAL /HPF
BASOPHILS # BLD AUTO: 0.04 K/UL — SIGNIFICANT CHANGE UP (ref 0–0.2)
BASOPHILS NFR BLD AUTO: 0.4 % — SIGNIFICANT CHANGE UP (ref 0–2)
BILIRUB SERPL-MCNC: 0.5 MG/DL — SIGNIFICANT CHANGE UP (ref 0.2–1.2)
BILIRUB SERPL-MCNC: <0.2 MG/DL — SIGNIFICANT CHANGE UP (ref 0.2–1.2)
BILIRUB UR-MCNC: NEGATIVE — SIGNIFICANT CHANGE UP
BUN SERPL-MCNC: 21 MG/DL — SIGNIFICANT CHANGE UP (ref 7–23)
BUN SERPL-MCNC: 21 MG/DL — SIGNIFICANT CHANGE UP (ref 7–23)
CALCIUM SERPL-MCNC: 8.7 MG/DL — SIGNIFICANT CHANGE UP (ref 8.4–10.5)
CALCIUM SERPL-MCNC: 8.9 MG/DL — SIGNIFICANT CHANGE UP (ref 8.4–10.5)
CAST: 5 /LPF — HIGH (ref 0–4)
CHLORIDE SERPL-SCNC: 103 MMOL/L — SIGNIFICANT CHANGE UP (ref 98–107)
CHLORIDE SERPL-SCNC: 109 MMOL/L — HIGH (ref 98–107)
CO2 SERPL-SCNC: 22 MMOL/L — SIGNIFICANT CHANGE UP (ref 22–31)
CO2 SERPL-SCNC: 24 MMOL/L — SIGNIFICANT CHANGE UP (ref 22–31)
COARSE GRAN CASTS #/AREA URNS AUTO: PRESENT
COLOR SPEC: YELLOW — SIGNIFICANT CHANGE UP
CREAT SERPL-MCNC: 1.04 MG/DL — SIGNIFICANT CHANGE UP (ref 0.5–1.3)
CREAT SERPL-MCNC: 1.27 MG/DL — SIGNIFICANT CHANGE UP (ref 0.5–1.3)
DIFF PNL FLD: ABNORMAL
EGFR: 57 ML/MIN/1.73M2 — LOW
EGFR: 57 ML/MIN/1.73M2 — LOW
EGFR: 72 ML/MIN/1.73M2 — SIGNIFICANT CHANGE UP
EGFR: 72 ML/MIN/1.73M2 — SIGNIFICANT CHANGE UP
EOSINOPHIL # BLD AUTO: 0.03 K/UL — SIGNIFICANT CHANGE UP (ref 0–0.5)
EOSINOPHIL NFR BLD AUTO: 0.3 % — SIGNIFICANT CHANGE UP (ref 0–6)
GAS PNL BLDV: SIGNIFICANT CHANGE UP
GLUCOSE SERPL-MCNC: 213 MG/DL — HIGH (ref 70–99)
GLUCOSE SERPL-MCNC: 258 MG/DL — HIGH (ref 70–99)
GLUCOSE UR QL: 500 MG/DL
HCT VFR BLD CALC: 37.7 % — LOW (ref 39–50)
HGB BLD-MCNC: 12.3 G/DL — LOW (ref 13–17)
HYALINE CASTS # UR AUTO: PRESENT
IMM GRANULOCYTES # BLD AUTO: 0.02 K/UL — SIGNIFICANT CHANGE UP (ref 0–0.07)
IMM GRANULOCYTES NFR BLD AUTO: 0.2 % — SIGNIFICANT CHANGE UP (ref 0–0.9)
KETONES UR QL: ABNORMAL MG/DL
LEUKOCYTE ESTERASE UR-ACNC: ABNORMAL
LYMPHOCYTES # BLD AUTO: 1.76 K/UL — SIGNIFICANT CHANGE UP (ref 1–3.3)
LYMPHOCYTES NFR BLD AUTO: 17.6 % — SIGNIFICANT CHANGE UP (ref 13–44)
MCHC RBC-ENTMCNC: 29.7 PG — SIGNIFICANT CHANGE UP (ref 27–34)
MCHC RBC-ENTMCNC: 32.6 G/DL — SIGNIFICANT CHANGE UP (ref 32–36)
MCV RBC AUTO: 91.1 FL — SIGNIFICANT CHANGE UP (ref 80–100)
MONOCYTES # BLD AUTO: 0.91 K/UL — HIGH (ref 0–0.9)
MONOCYTES NFR BLD AUTO: 9.1 % — SIGNIFICANT CHANGE UP (ref 2–14)
NEUTROPHILS # BLD AUTO: 7.26 K/UL — SIGNIFICANT CHANGE UP (ref 1.8–7.4)
NEUTROPHILS NFR BLD AUTO: 72.4 % — SIGNIFICANT CHANGE UP (ref 43–77)
NITRITE UR-MCNC: NEGATIVE — SIGNIFICANT CHANGE UP
NRBC # BLD AUTO: 0 K/UL — SIGNIFICANT CHANGE UP (ref 0–0)
NRBC # FLD: 0 K/UL — SIGNIFICANT CHANGE UP (ref 0–0)
NRBC BLD AUTO-RTO: 0 /100 WBCS — SIGNIFICANT CHANGE UP (ref 0–0)
PH UR: 6.5 — SIGNIFICANT CHANGE UP (ref 5–8)
PLATELET # BLD AUTO: 245 K/UL — SIGNIFICANT CHANGE UP (ref 150–400)
PMV BLD: 11.6 FL — SIGNIFICANT CHANGE UP (ref 7–13)
POTASSIUM SERPL-MCNC: 4 MMOL/L — SIGNIFICANT CHANGE UP (ref 3.5–5.3)
POTASSIUM SERPL-MCNC: SIGNIFICANT CHANGE UP MMOL/L (ref 3.5–5.3)
POTASSIUM SERPL-SCNC: 4 MMOL/L — SIGNIFICANT CHANGE UP (ref 3.5–5.3)
POTASSIUM SERPL-SCNC: SIGNIFICANT CHANGE UP MMOL/L (ref 3.5–5.3)
PROT SERPL-MCNC: 6.7 G/DL — SIGNIFICANT CHANGE UP (ref 6–8.3)
PROT SERPL-MCNC: SIGNIFICANT CHANGE UP G/DL (ref 6–8.3)
PROT UR-MCNC: >=1000 MG/DL
RBC # BLD: 4.14 M/UL — LOW (ref 4.2–5.8)
RBC # FLD: 12.3 % — SIGNIFICANT CHANGE UP (ref 10.3–14.5)
RBC CASTS # UR COMP ASSIST: 18 /HPF — HIGH (ref 0–4)
REVIEW: SIGNIFICANT CHANGE UP
SODIUM SERPL-SCNC: 132 MMOL/L — LOW (ref 135–145)
SODIUM SERPL-SCNC: 142 MMOL/L — SIGNIFICANT CHANGE UP (ref 135–145)
SP GR SPEC: 1.03 — SIGNIFICANT CHANGE UP (ref 1–1.03)
SQUAMOUS # UR AUTO: 10 /HPF — HIGH (ref 0–5)
UROBILINOGEN FLD QL: 1 MG/DL — SIGNIFICANT CHANGE UP (ref 0.2–1)
WBC # BLD: 10.02 K/UL — SIGNIFICANT CHANGE UP (ref 3.8–10.5)
WBC # FLD AUTO: 10.02 K/UL — SIGNIFICANT CHANGE UP (ref 3.8–10.5)
WBC UR QL: 1483 /HPF — HIGH (ref 0–5)

## 2025-08-02 PROCEDURE — 76870 US EXAM SCROTUM: CPT | Mod: 26

## 2025-08-02 PROCEDURE — 99284 EMERGENCY DEPT VISIT MOD MDM: CPT

## 2025-08-02 PROCEDURE — 71046 X-RAY EXAM CHEST 2 VIEWS: CPT | Mod: 26

## 2025-08-02 RX ORDER — LEVOFLOXACIN 25 MG/ML
1 SOLUTION ORAL
Qty: 9 | Refills: 0
Start: 2025-08-02 | End: 2025-08-10

## 2025-08-05 LAB
-  AMPICILLIN/SULBACTAM: SIGNIFICANT CHANGE UP
-  AMPICILLIN/SULBACTAM: SIGNIFICANT CHANGE UP
-  AMPICILLIN: SIGNIFICANT CHANGE UP
-  AMPICILLIN: SIGNIFICANT CHANGE UP
-  AZTREONAM: SIGNIFICANT CHANGE UP
-  AZTREONAM: SIGNIFICANT CHANGE UP
-  CEFAZOLIN: SIGNIFICANT CHANGE UP
-  CEFAZOLIN: SIGNIFICANT CHANGE UP
-  CEFEPIME: SIGNIFICANT CHANGE UP
-  CEFEPIME: SIGNIFICANT CHANGE UP
-  CEFTRIAXONE: SIGNIFICANT CHANGE UP
-  CEFTRIAXONE: SIGNIFICANT CHANGE UP
-  CEFUROXIME: SIGNIFICANT CHANGE UP
-  CEFUROXIME: SIGNIFICANT CHANGE UP
-  CIPROFLOXACIN: SIGNIFICANT CHANGE UP
-  CIPROFLOXACIN: SIGNIFICANT CHANGE UP
-  ERTAPENEM: SIGNIFICANT CHANGE UP
-  ERTAPENEM: SIGNIFICANT CHANGE UP
-  GENTAMICIN: SIGNIFICANT CHANGE UP
-  GENTAMICIN: SIGNIFICANT CHANGE UP
-  IMIPENEM: SIGNIFICANT CHANGE UP
-  IMIPENEM: SIGNIFICANT CHANGE UP
-  LEVOFLOXACIN: SIGNIFICANT CHANGE UP
-  LEVOFLOXACIN: SIGNIFICANT CHANGE UP
-  MEROPENEM: SIGNIFICANT CHANGE UP
-  MEROPENEM: SIGNIFICANT CHANGE UP
-  NITROFURANTOIN: SIGNIFICANT CHANGE UP
-  NITROFURANTOIN: SIGNIFICANT CHANGE UP
-  PIPERACILLIN/TAZOBACTAM: SIGNIFICANT CHANGE UP
-  PIPERACILLIN/TAZOBACTAM: SIGNIFICANT CHANGE UP
-  TIGECYCLINE: SIGNIFICANT CHANGE UP
-  TIGECYCLINE: SIGNIFICANT CHANGE UP
-  TOBRAMYCIN: SIGNIFICANT CHANGE UP
-  TOBRAMYCIN: SIGNIFICANT CHANGE UP
-  TRIMETHOPRIM/SULFAMETHOXAZOLE: SIGNIFICANT CHANGE UP
-  TRIMETHOPRIM/SULFAMETHOXAZOLE: SIGNIFICANT CHANGE UP
CULTURE RESULTS: ABNORMAL
CULTURE RESULTS: ABNORMAL
METHOD TYPE: SIGNIFICANT CHANGE UP
METHOD TYPE: SIGNIFICANT CHANGE UP
ORGANISM # SPEC MICROSCOPIC CNT: ABNORMAL
SPECIMEN SOURCE: SIGNIFICANT CHANGE UP
SPECIMEN SOURCE: SIGNIFICANT CHANGE UP

## 2025-09-04 ENCOUNTER — EMERGENCY (EMERGENCY)
Facility: HOSPITAL | Age: 80
LOS: 1 days | End: 2025-09-04
Attending: EMERGENCY MEDICINE | Admitting: EMERGENCY MEDICINE
Payer: MEDICARE

## 2025-09-04 VITALS
HEIGHT: 71 IN | OXYGEN SATURATION: 98 % | WEIGHT: 199.96 LBS | HEART RATE: 87 BPM | SYSTOLIC BLOOD PRESSURE: 125 MMHG | DIASTOLIC BLOOD PRESSURE: 76 MMHG | RESPIRATION RATE: 17 BRPM | TEMPERATURE: 99 F

## 2025-09-04 VITALS
TEMPERATURE: 98 F | SYSTOLIC BLOOD PRESSURE: 125 MMHG | RESPIRATION RATE: 16 BRPM | DIASTOLIC BLOOD PRESSURE: 57 MMHG | HEART RATE: 85 BPM | OXYGEN SATURATION: 100 %

## 2025-09-04 DIAGNOSIS — Z95.5 PRESENCE OF CORONARY ANGIOPLASTY IMPLANT AND GRAFT: Chronic | ICD-10-CM

## 2025-09-04 LAB
ALBUMIN SERPL ELPH-MCNC: 2.8 G/DL — LOW (ref 3.3–5)
ALP SERPL-CCNC: 99 U/L — SIGNIFICANT CHANGE UP (ref 40–120)
ALT FLD-CCNC: 11 U/L — SIGNIFICANT CHANGE UP (ref 4–41)
ANION GAP SERPL CALC-SCNC: 11 MMOL/L — SIGNIFICANT CHANGE UP (ref 7–14)
APPEARANCE UR: ABNORMAL
AST SERPL-CCNC: 17 U/L — SIGNIFICANT CHANGE UP (ref 4–40)
BACTERIA # UR AUTO: ABNORMAL /HPF
BASOPHILS # BLD AUTO: 0.02 K/UL — SIGNIFICANT CHANGE UP (ref 0–0.2)
BASOPHILS NFR BLD AUTO: 0.2 % — SIGNIFICANT CHANGE UP (ref 0–2)
BILIRUB SERPL-MCNC: 0.3 MG/DL — SIGNIFICANT CHANGE UP (ref 0.2–1.2)
BILIRUB UR-MCNC: NEGATIVE — SIGNIFICANT CHANGE UP
BUN SERPL-MCNC: 24 MG/DL — HIGH (ref 7–23)
CALCIUM SERPL-MCNC: 8.7 MG/DL — SIGNIFICANT CHANGE UP (ref 8.4–10.5)
CAST: 33 /LPF — HIGH (ref 0–4)
CHLORIDE SERPL-SCNC: 105 MMOL/L — SIGNIFICANT CHANGE UP (ref 98–107)
CO2 SERPL-SCNC: 20 MMOL/L — LOW (ref 22–31)
COLOR SPEC: SIGNIFICANT CHANGE UP
CREAT SERPL-MCNC: 1.37 MG/DL — HIGH (ref 0.5–1.3)
DIFF PNL FLD: ABNORMAL
EGFR: 52 ML/MIN/1.73M2 — LOW
EGFR: 52 ML/MIN/1.73M2 — LOW
EOSINOPHIL # BLD AUTO: 0.02 K/UL — SIGNIFICANT CHANGE UP (ref 0–0.5)
EOSINOPHIL NFR BLD AUTO: 0.2 % — SIGNIFICANT CHANGE UP (ref 0–6)
GLUCOSE SERPL-MCNC: 279 MG/DL — HIGH (ref 70–99)
GLUCOSE UR QL: 500 MG/DL
HCT VFR BLD CALC: 36 % — LOW (ref 39–50)
HGB BLD-MCNC: 11.6 G/DL — LOW (ref 13–17)
IMM GRANULOCYTES # BLD AUTO: 0.07 K/UL — SIGNIFICANT CHANGE UP (ref 0–0.07)
IMM GRANULOCYTES NFR BLD AUTO: 0.6 % — SIGNIFICANT CHANGE UP (ref 0–0.9)
KETONES UR QL: ABNORMAL MG/DL
LEUKOCYTE ESTERASE UR-ACNC: ABNORMAL
LYMPHOCYTES # BLD AUTO: 1.75 K/UL — SIGNIFICANT CHANGE UP (ref 1–3.3)
LYMPHOCYTES NFR BLD AUTO: 15.8 % — SIGNIFICANT CHANGE UP (ref 13–44)
MCHC RBC-ENTMCNC: 29.1 PG — SIGNIFICANT CHANGE UP (ref 27–34)
MCHC RBC-ENTMCNC: 32.2 G/DL — SIGNIFICANT CHANGE UP (ref 32–36)
MCV RBC AUTO: 90.5 FL — SIGNIFICANT CHANGE UP (ref 80–100)
MONOCYTES # BLD AUTO: 1.1 K/UL — HIGH (ref 0–0.9)
MONOCYTES NFR BLD AUTO: 9.9 % — SIGNIFICANT CHANGE UP (ref 2–14)
NEUTROPHILS # BLD AUTO: 8.11 K/UL — HIGH (ref 1.8–7.4)
NEUTROPHILS NFR BLD AUTO: 73.3 % — SIGNIFICANT CHANGE UP (ref 43–77)
NITRITE UR-MCNC: NEGATIVE — SIGNIFICANT CHANGE UP
NRBC # BLD AUTO: 0 K/UL — SIGNIFICANT CHANGE UP (ref 0–0)
NRBC # FLD: 0 K/UL — SIGNIFICANT CHANGE UP (ref 0–0)
NRBC BLD AUTO-RTO: 0 /100 WBCS — SIGNIFICANT CHANGE UP (ref 0–0)
PH UR: 6.5 — SIGNIFICANT CHANGE UP (ref 5–8)
PLATELET # BLD AUTO: 175 K/UL — SIGNIFICANT CHANGE UP (ref 150–400)
PMV BLD: 12.3 FL — SIGNIFICANT CHANGE UP (ref 7–13)
POTASSIUM SERPL-MCNC: 4.5 MMOL/L — SIGNIFICANT CHANGE UP (ref 3.5–5.3)
POTASSIUM SERPL-SCNC: 4.5 MMOL/L — SIGNIFICANT CHANGE UP (ref 3.5–5.3)
PROT SERPL-MCNC: 7.1 G/DL — SIGNIFICANT CHANGE UP (ref 6–8.3)
PROT UR-MCNC: >=1000 MG/DL
RBC # BLD: 3.98 M/UL — LOW (ref 4.2–5.8)
RBC # FLD: 13.1 % — SIGNIFICANT CHANGE UP (ref 10.3–14.5)
RBC CASTS # UR COMP ASSIST: 51 /HPF — HIGH (ref 0–4)
REVIEW: SIGNIFICANT CHANGE UP
SODIUM SERPL-SCNC: 136 MMOL/L — SIGNIFICANT CHANGE UP (ref 135–145)
SP GR SPEC: 1.03 — SIGNIFICANT CHANGE UP (ref 1–1.03)
SQUAMOUS # UR AUTO: 11 /HPF — HIGH (ref 0–5)
UROBILINOGEN FLD QL: 1 MG/DL — SIGNIFICANT CHANGE UP (ref 0.2–1)
WBC # BLD: 11.07 K/UL — HIGH (ref 3.8–10.5)
WBC # FLD AUTO: 11.07 K/UL — HIGH (ref 3.8–10.5)
WBC UR QL: 5753 /HPF — HIGH (ref 0–5)

## 2025-09-04 PROCEDURE — 99283 EMERGENCY DEPT VISIT LOW MDM: CPT

## 2025-09-04 PROCEDURE — 76870 US EXAM SCROTUM: CPT | Mod: 26

## 2025-09-04 PROCEDURE — 99285 EMERGENCY DEPT VISIT HI MDM: CPT

## 2025-09-04 PROCEDURE — 99282 EMERGENCY DEPT VISIT SF MDM: CPT

## 2025-09-04 RX ORDER — IBUPROFEN 200 MG
600 TABLET ORAL ONCE
Refills: 0 | Status: COMPLETED | OUTPATIENT
Start: 2025-09-04 | End: 2025-09-04

## 2025-09-04 RX ADMIN — Medication 600 MILLIGRAM(S): at 20:50

## 2025-09-05 RX ORDER — SULFAMETHOXAZOLE/TRIMETHOPRIM 800-160 MG
1 TABLET ORAL
Qty: 20 | Refills: 0
Start: 2025-09-05 | End: 2025-09-14

## 2025-09-07 LAB
-  AMPICILLIN/SULBACTAM: SIGNIFICANT CHANGE UP
-  AMPICILLIN: SIGNIFICANT CHANGE UP
-  AZTREONAM: SIGNIFICANT CHANGE UP
-  CEFAZOLIN: SIGNIFICANT CHANGE UP
-  CEFEPIME: SIGNIFICANT CHANGE UP
-  CEFTRIAXONE: SIGNIFICANT CHANGE UP
-  CEFUROXIME: SIGNIFICANT CHANGE UP
-  CIPROFLOXACIN: SIGNIFICANT CHANGE UP
-  ERTAPENEM: SIGNIFICANT CHANGE UP
-  GENTAMICIN: SIGNIFICANT CHANGE UP
-  IMIPENEM: SIGNIFICANT CHANGE UP
-  LEVOFLOXACIN: SIGNIFICANT CHANGE UP
-  MEROPENEM: SIGNIFICANT CHANGE UP
-  NITROFURANTOIN: SIGNIFICANT CHANGE UP
-  PIPERACILLIN/TAZOBACTAM: SIGNIFICANT CHANGE UP
-  TIGECYCLINE: SIGNIFICANT CHANGE UP
-  TOBRAMYCIN: SIGNIFICANT CHANGE UP
-  TRIMETHOPRIM/SULFAMETHOXAZOLE: SIGNIFICANT CHANGE UP
CULTURE RESULTS: ABNORMAL
METHOD TYPE: SIGNIFICANT CHANGE UP
ORGANISM # SPEC MICROSCOPIC CNT: ABNORMAL
ORGANISM # SPEC MICROSCOPIC CNT: ABNORMAL
SPECIMEN SOURCE: SIGNIFICANT CHANGE UP

## 2025-09-15 ENCOUNTER — NON-APPOINTMENT (OUTPATIENT)
Age: 80
End: 2025-09-15

## 2025-09-15 ENCOUNTER — TRANSCRIPTION ENCOUNTER (OUTPATIENT)
Age: 80
End: 2025-09-15

## 2025-09-15 ENCOUNTER — RESULT REVIEW (OUTPATIENT)
Age: 80
End: 2025-09-15

## 2025-09-17 ENCOUNTER — APPOINTMENT (OUTPATIENT)
Dept: UROLOGY | Facility: CLINIC | Age: 80
End: 2025-09-17

## 2025-09-19 ENCOUNTER — NON-APPOINTMENT (OUTPATIENT)
Age: 80
End: 2025-09-19